# Patient Record
Sex: MALE | Race: WHITE | NOT HISPANIC OR LATINO | Employment: FULL TIME | URBAN - METROPOLITAN AREA
[De-identification: names, ages, dates, MRNs, and addresses within clinical notes are randomized per-mention and may not be internally consistent; named-entity substitution may affect disease eponyms.]

---

## 2018-02-07 ENCOUNTER — OFFICE VISIT (OUTPATIENT)
Dept: FAMILY MEDICINE CLINIC | Facility: CLINIC | Age: 33
End: 2018-02-07

## 2018-02-07 VITALS
HEIGHT: 72 IN | BODY MASS INDEX: 42.66 KG/M2 | DIASTOLIC BLOOD PRESSURE: 90 MMHG | WEIGHT: 315 LBS | SYSTOLIC BLOOD PRESSURE: 120 MMHG | OXYGEN SATURATION: 99 % | HEART RATE: 70 BPM | TEMPERATURE: 98.4 F

## 2018-02-07 DIAGNOSIS — Z00.00 VISIT FOR PREVENTIVE HEALTH EXAMINATION: Primary | ICD-10-CM

## 2018-02-07 PROCEDURE — 99395 PREV VISIT EST AGE 18-39: CPT | Performed by: INTERNAL MEDICINE

## 2018-02-07 NOTE — PROGRESS NOTES
Subjective chief complaint is a checkup and health screen  Taiwo Parisi is a 32 y.o. male.     History of Present Illness   Taiwo is here today for a checkup and health screening.  He basically has been feeling well.  His weight is fairly stable at 354 pounds.  His body mass index is 48.  He currently is on no prescription medicines other than occasionally Vimovo or knee pain.  This was prescribed by the orthopedist.  Have a prior history of some elevated blood pressure and hyperlipidemia.  He is a lifelong nonsmoker.  The following portions of the patient's history were reviewed and updated as appropriate: allergies, current medications, past medical history and problem list.    Review of Systems   Respiratory: Negative for chest tightness and shortness of breath.    Cardiovascular: Negative for chest pain.   Neurological: Negative for dizziness, light-headedness and headaches.       Objective   Physical Exam   Constitutional: He is oriented to person, place, and time. He appears well-developed and well-nourished.   Waist circumference is 48 inches   HENT:   Head: Normocephalic and atraumatic.   Eyes: No scleral icterus.   Cardiovascular: Normal rate, regular rhythm and normal heart sounds.    Pulmonary/Chest: Breath sounds normal. No respiratory distress. He has no wheezes. He has no rales.   Abdominal: Soft. Bowel sounds are normal. He exhibits no distension. There is no tenderness. There is no rebound and no guarding.   Musculoskeletal: He exhibits no edema.   Neurological: He is alert and oriented to person, place, and time.   Skin: Skin is warm and dry.   Psychiatric: He has a normal mood and affect.   Nursing note and vitals reviewed.      Assessment/Plan   Taiwo was seen today for follow-up.    Diagnoses and all orders for this visit:    Visit for preventive health examination  -     Lipid Panel  -     Basic Metabolic Panel  -     Nicotine Screen, Urine - Urine, Clean Catch     Taiwo is here today for  a preventive screening.  We are going to check the status of his cholesterol and blood sugar.  Further counseling will be done accordingly.  We did discuss possibility of some hepatitis B vaccinations we will discuss this once his lab results have returned.

## 2018-02-08 LAB
BUN SERPL-MCNC: 13 MG/DL (ref 6–20)
BUN/CREAT SERPL: 17 (ref 9–20)
CALCIUM SERPL-MCNC: 9.8 MG/DL (ref 8.7–10.2)
CHLORIDE SERPL-SCNC: 101 MMOL/L (ref 96–106)
CHOLEST SERPL-MCNC: 291 MG/DL (ref 100–199)
CO2 SERPL-SCNC: 25 MMOL/L (ref 18–29)
COTININE UR QL SCN: NEGATIVE NG/ML
CREAT SERPL-MCNC: 0.75 MG/DL (ref 0.76–1.27)
GFR SERPLBLD CREATININE-BSD FMLA CKD-EPI: 120 ML/MIN/1.73
GFR SERPLBLD CREATININE-BSD FMLA CKD-EPI: 138 ML/MIN/1.73
GLUCOSE SERPL-MCNC: 77 MG/DL (ref 65–99)
HDLC SERPL-MCNC: 37 MG/DL
INTERPRETATION: NORMAL
LDLC SERPL CALC-MCNC: 192 MG/DL (ref 0–99)
Lab: NORMAL
POTASSIUM SERPL-SCNC: 4.1 MMOL/L (ref 3.5–5.2)
SODIUM SERPL-SCNC: 145 MMOL/L (ref 134–144)
TRIGL SERPL-MCNC: 309 MG/DL (ref 0–149)
VLDLC SERPL CALC-MCNC: 62 MG/DL (ref 5–40)

## 2018-02-08 RX ORDER — ATORVASTATIN CALCIUM 10 MG/1
10 TABLET, FILM COATED ORAL DAILY
Qty: 30 TABLET | Refills: 5 | Status: SHIPPED | OUTPATIENT
Start: 2018-02-08 | End: 2018-06-20 | Stop reason: SDUPTHER

## 2018-06-18 ENCOUNTER — OFFICE VISIT (OUTPATIENT)
Dept: FAMILY MEDICINE CLINIC | Facility: CLINIC | Age: 33
End: 2018-06-18

## 2018-06-18 VITALS
SYSTOLIC BLOOD PRESSURE: 130 MMHG | HEIGHT: 72 IN | DIASTOLIC BLOOD PRESSURE: 90 MMHG | TEMPERATURE: 98.5 F | WEIGHT: 315 LBS | OXYGEN SATURATION: 99 % | HEART RATE: 70 BPM | BODY MASS INDEX: 42.66 KG/M2

## 2018-06-18 DIAGNOSIS — I10 ESSENTIAL HYPERTENSION: ICD-10-CM

## 2018-06-18 DIAGNOSIS — E78.2 MIXED HYPERLIPIDEMIA: Primary | ICD-10-CM

## 2018-06-18 PROCEDURE — 99214 OFFICE O/P EST MOD 30 MIN: CPT | Performed by: INTERNAL MEDICINE

## 2018-06-18 PROCEDURE — 90746 HEPB VACCINE 3 DOSE ADULT IM: CPT | Performed by: INTERNAL MEDICINE

## 2018-06-18 PROCEDURE — 90471 IMMUNIZATION ADMIN: CPT | Performed by: INTERNAL MEDICINE

## 2018-06-18 RX ORDER — LISINOPRIL 10 MG/1
10 TABLET ORAL DAILY
Qty: 30 TABLET | Refills: 5 | Status: SHIPPED | OUTPATIENT
Start: 2018-06-18 | End: 2018-07-19 | Stop reason: SDUPTHER

## 2018-06-18 NOTE — PROGRESS NOTES
Subjective if complaint is follow-up for cholesterol but also questions about vaccines  Taiwo Parisi is a 32 y.o. male.     History of Present Illness   Taiwo is here today for follow-up.  He does have hyperlipidemia.  We did start him on 10 mg of Lipitor in February.  He has tolerated this without muscle side effects.  He has had some mild elevations in his blood pressure.  We have not started him on any medication.  His blood pressure is elevated again today.  He is on vacation right now.  He does work in the [employer] field.  He has had the initial 2 hepatitis B vaccines but has not had the third.  He also would like a hepatitis A vaccine.  Does have a question about rabies.  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  He has not actually had any animal bites.  The following portions of the patient's history were reviewed and updated as appropriate: allergies, current medications, past medical history and problem list.    Review of Systems   Respiratory: Negative for chest tightness and shortness of breath.    Cardiovascular: Negative for chest pain and leg swelling.   Musculoskeletal: Negative for myalgias.   Neurological: Negative for dizziness, light-headedness and headache.       Objective   Physical Exam   Constitutional:   He is obese by body mass index.   Cardiovascular: Normal rate, regular rhythm, normal heart sounds and intact distal pulses.    [employer] pressure to my exam is 120/92   Pulmonary/Chest: Effort normal and breath sounds normal. No respiratory distress. He has no wheezes. He has no rales.   Abdominal: Soft. Bowel sounds are normal. He exhibits no distension. There is no tenderness. There is no guarding.   Musculoskeletal: He exhibits no edema.   Nursing note and vitals reviewed.        Assessment/Plan   Taiwo was seen today for follow-up.    Diagnoses and all orders for this visit:    Mixed hyperlipidemia  -     Comprehensive Metabolic Panel  -     Lipid Panel    Essential hypertension  -     CBC &  Differential    Other orders  -     lisinopril (PRINIVIL,ZESTRIL) 10 MG tablet; Take 1 tablet by mouth Daily.  -     Hepatitis B Vaccine Adult CHIKI Maravilla is here today for follow-up on his cholesterol.  We are going to check on this.  He may need a slightly higher dose.  His blood pressure is remaining elevated.  I'm going to start him on 10 mg of lisinopril daily.  We did discuss its side effects.  We are going to administer the third in the series of hepatitis B vaccine.  We will check antibodies down the road.  He is also going to get hepatitis A vaccine at an outside facility.

## 2018-06-19 LAB
ALBUMIN SERPL-MCNC: 4.5 G/DL (ref 3.5–5.2)
ALBUMIN/GLOB SERPL: 1.7 G/DL
ALP SERPL-CCNC: 64 U/L (ref 39–117)
ALT SERPL-CCNC: 32 U/L (ref 1–41)
AST SERPL-CCNC: 23 U/L (ref 1–40)
BASOPHILS # BLD AUTO: 0.01 10*3/MM3 (ref 0–0.2)
BASOPHILS NFR BLD AUTO: 0.2 % (ref 0–1.5)
BILIRUB SERPL-MCNC: 0.2 MG/DL (ref 0.1–1.2)
BUN SERPL-MCNC: 13 MG/DL (ref 6–20)
BUN/CREAT SERPL: 17.1 (ref 7–25)
CALCIUM SERPL-MCNC: 9.5 MG/DL (ref 8.6–10.5)
CHLORIDE SERPL-SCNC: 107 MMOL/L (ref 98–107)
CHOLEST SERPL-MCNC: 248 MG/DL (ref 0–200)
CO2 SERPL-SCNC: 23.5 MMOL/L (ref 22–29)
CREAT SERPL-MCNC: 0.76 MG/DL (ref 0.76–1.27)
EOSINOPHIL # BLD AUTO: 0.06 10*3/MM3 (ref 0–0.7)
EOSINOPHIL NFR BLD AUTO: 1.2 % (ref 0.3–6.2)
ERYTHROCYTE [DISTWIDTH] IN BLOOD BY AUTOMATED COUNT: 12.9 % (ref 11.5–14.5)
GFR SERPLBLD CREATININE-BSD FMLA CKD-EPI: 117 ML/MIN/1.73
GFR SERPLBLD CREATININE-BSD FMLA CKD-EPI: 142 ML/MIN/1.73
GLOBULIN SER CALC-MCNC: 2.7 GM/DL
GLUCOSE SERPL-MCNC: 94 MG/DL (ref 65–99)
HCT VFR BLD AUTO: 42.8 % (ref 40.4–52.2)
HDLC SERPL-MCNC: 41 MG/DL (ref 40–60)
HGB BLD-MCNC: 13.5 G/DL (ref 13.7–17.6)
IMM GRANULOCYTES # BLD: 0 10*3/MM3 (ref 0–0.03)
IMM GRANULOCYTES NFR BLD: 0 % (ref 0–0.5)
LDLC SERPL CALC-MCNC: 153 MG/DL (ref 0–100)
LYMPHOCYTES # BLD AUTO: 2.5 10*3/MM3 (ref 0.9–4.8)
LYMPHOCYTES NFR BLD AUTO: 50.7 % (ref 19.6–45.3)
MCH RBC QN AUTO: 28.5 PG (ref 27–32.7)
MCHC RBC AUTO-ENTMCNC: 31.5 G/DL (ref 32.6–36.4)
MCV RBC AUTO: 90.5 FL (ref 79.8–96.2)
MONOCYTES # BLD AUTO: 0.46 10*3/MM3 (ref 0.2–1.2)
MONOCYTES NFR BLD AUTO: 9.3 % (ref 5–12)
NEUTROPHILS # BLD AUTO: 1.9 10*3/MM3 (ref 1.9–8.1)
NEUTROPHILS NFR BLD AUTO: 38.6 % (ref 42.7–76)
PLATELET # BLD AUTO: 264 10*3/MM3 (ref 140–500)
POTASSIUM SERPL-SCNC: 4.8 MMOL/L (ref 3.5–5.2)
PROT SERPL-MCNC: 7.2 G/DL (ref 6–8.5)
RBC # BLD AUTO: 4.73 10*6/MM3 (ref 4.6–6)
SODIUM SERPL-SCNC: 146 MMOL/L (ref 136–145)
TRIGL SERPL-MCNC: 270 MG/DL (ref 0–150)
VLDLC SERPL CALC-MCNC: 54 MG/DL (ref 5–40)
WBC # BLD AUTO: 4.93 10*3/MM3 (ref 4.5–10.7)

## 2018-06-20 RX ORDER — ATORVASTATIN CALCIUM 20 MG/1
20 TABLET, FILM COATED ORAL DAILY
Qty: 90 TABLET | Refills: 1 | Status: SHIPPED | OUTPATIENT
Start: 2018-06-20 | End: 2018-10-26 | Stop reason: SDUPTHER

## 2018-07-19 ENCOUNTER — OFFICE VISIT (OUTPATIENT)
Dept: FAMILY MEDICINE CLINIC | Facility: CLINIC | Age: 33
End: 2018-07-19

## 2018-07-19 VITALS
HEIGHT: 72 IN | DIASTOLIC BLOOD PRESSURE: 75 MMHG | TEMPERATURE: 98 F | HEART RATE: 84 BPM | OXYGEN SATURATION: 97 % | WEIGHT: 315 LBS | BODY MASS INDEX: 42.66 KG/M2 | SYSTOLIC BLOOD PRESSURE: 125 MMHG

## 2018-07-19 DIAGNOSIS — R51.9 ACUTE NONINTRACTABLE HEADACHE, UNSPECIFIED HEADACHE TYPE: ICD-10-CM

## 2018-07-19 DIAGNOSIS — E78.2 MIXED HYPERLIPIDEMIA: ICD-10-CM

## 2018-07-19 DIAGNOSIS — I10 ESSENTIAL HYPERTENSION: Primary | ICD-10-CM

## 2018-07-19 DIAGNOSIS — Z01.84 IMMUNITY STATUS TESTING: ICD-10-CM

## 2018-07-19 PROCEDURE — 99214 OFFICE O/P EST MOD 30 MIN: CPT | Performed by: INTERNAL MEDICINE

## 2018-07-19 RX ORDER — LISINOPRIL 20 MG/1
20 TABLET ORAL DAILY
Qty: 30 TABLET | Refills: 5 | Status: SHIPPED | OUTPATIENT
Start: 2018-07-19 | End: 2019-03-18 | Stop reason: SDUPTHER

## 2018-07-20 LAB
ALBUMIN SERPL-MCNC: 4.9 G/DL (ref 3.5–5.2)
ALP SERPL-CCNC: 71 U/L (ref 39–117)
ALT SERPL-CCNC: 32 U/L (ref 1–41)
AST SERPL-CCNC: 18 U/L (ref 1–40)
BILIRUB DIRECT SERPL-MCNC: <0.2 MG/DL (ref 0–0.3)
BILIRUB SERPL-MCNC: 0.4 MG/DL (ref 0.1–1.2)
CHOLEST SERPL-MCNC: 205 MG/DL (ref 0–200)
CK SERPL-CCNC: 173 U/L (ref 20–200)
HBV SURFACE AB SER-ACNC: >1000 MIU/ML
HDLC SERPL-MCNC: 40 MG/DL (ref 40–60)
LDLC SERPL CALC-MCNC: 118 MG/DL (ref 0–100)
PROT SERPL-MCNC: 7.5 G/DL (ref 6–8.5)
TRIGL SERPL-MCNC: 237 MG/DL (ref 0–150)
VLDLC SERPL CALC-MCNC: 47.4 MG/DL (ref 5–40)

## 2018-10-25 ENCOUNTER — OFFICE VISIT (OUTPATIENT)
Dept: FAMILY MEDICINE CLINIC | Facility: CLINIC | Age: 33
End: 2018-10-25

## 2018-10-25 VITALS
WEIGHT: 315 LBS | DIASTOLIC BLOOD PRESSURE: 84 MMHG | BODY MASS INDEX: 42.66 KG/M2 | HEART RATE: 61 BPM | TEMPERATURE: 99 F | OXYGEN SATURATION: 98 % | SYSTOLIC BLOOD PRESSURE: 120 MMHG | HEIGHT: 72 IN

## 2018-10-25 DIAGNOSIS — I10 ESSENTIAL HYPERTENSION: Primary | ICD-10-CM

## 2018-10-25 DIAGNOSIS — G47.10 HYPERSOMNOLENCE: ICD-10-CM

## 2018-10-25 DIAGNOSIS — E78.2 MIXED HYPERLIPIDEMIA: ICD-10-CM

## 2018-10-25 DIAGNOSIS — G47.00 INSOMNIA, UNSPECIFIED TYPE: ICD-10-CM

## 2018-10-25 LAB
ALBUMIN SERPL-MCNC: 4.8 G/DL (ref 3.5–5.2)
ALBUMIN/GLOB SERPL: 1.7 G/DL
ALP SERPL-CCNC: 73 U/L (ref 39–117)
ALT SERPL-CCNC: 40 U/L (ref 1–41)
AST SERPL-CCNC: 24 U/L (ref 1–40)
BILIRUB SERPL-MCNC: 0.4 MG/DL (ref 0.1–1.2)
BUN SERPL-MCNC: 13 MG/DL (ref 6–20)
BUN/CREAT SERPL: 15.1 (ref 7–25)
CALCIUM SERPL-MCNC: 9.7 MG/DL (ref 8.6–10.5)
CHLORIDE SERPL-SCNC: 103 MMOL/L (ref 98–107)
CHOLEST SERPL-MCNC: 203 MG/DL (ref 0–200)
CO2 SERPL-SCNC: 26.6 MMOL/L (ref 22–29)
CREAT SERPL-MCNC: 0.86 MG/DL (ref 0.76–1.27)
GLOBULIN SER CALC-MCNC: 2.9 GM/DL
GLUCOSE SERPL-MCNC: 89 MG/DL (ref 65–99)
HDLC SERPL-MCNC: 39 MG/DL (ref 40–60)
LDLC SERPL CALC-MCNC: 116 MG/DL (ref 0–100)
POTASSIUM SERPL-SCNC: 4.6 MMOL/L (ref 3.5–5.2)
PROT SERPL-MCNC: 7.7 G/DL (ref 6–8.5)
SODIUM SERPL-SCNC: 143 MMOL/L (ref 136–145)
T4 FREE SERPL-MCNC: 1.1 NG/DL (ref 0.93–1.7)
TRIGL SERPL-MCNC: 238 MG/DL (ref 0–150)
TSH SERPL DL<=0.005 MIU/L-ACNC: 0.89 MIU/ML (ref 0.27–4.2)
VLDLC SERPL CALC-MCNC: 47.6 MG/DL (ref 5–40)

## 2018-10-25 PROCEDURE — 99214 OFFICE O/P EST MOD 30 MIN: CPT | Performed by: INTERNAL MEDICINE

## 2018-10-25 NOTE — PROGRESS NOTES
Subjective chief complaint is follow-up for blood pressure and cholesterol.  Taiwo Parisi is a 33 y.o. male.     History of Present Illness   Taiwo is here today for follow-up.  He does have hypertension.  He is on lisinopril 20 mg.  This seems to be controlling things fairly well.  His headaches seem to have resolved.  He also has some hyperlipidemia.  He is on 20 mg of atorvastatin.  His cholesterol improved with this we did advise him to continue to watch his diet.  He has lost 12 pounds since his last visit.  His biggest problem seems to be trouble sleeping. In the last 3 weeks he has had trouble falling asleep and staying asleep.  He therefore is experiencing some daytime fatigue.  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  He reports that he had a home test for sleep apnea years ago but it was somewhat inconclusive.  The following portions of the patient's history were reviewed and updated as appropriate: allergies, current medications, past medical history and problem list.    Review of Systems   Constitutional: Positive for fatigue.   Respiratory: Negative for chest tightness and shortness of breath.    Cardiovascular: Negative for chest pain.   Psychiatric/Behavioral: Positive for sleep disturbance. The patient is not nervous/anxious.        Objective   Physical Exam   Constitutional: He appears well-developed and well-nourished.   Cardiovascular: Normal rate, regular rhythm and normal heart sounds.  Exam reveals no gallop and no friction rub.    No murmur heard.  Pulmonary/Chest: Effort normal and breath sounds normal. No respiratory distress. He has no wheezes. He has no rales.   Musculoskeletal: He exhibits no edema.   Psychiatric: He has a normal mood and affect.   Nursing note and vitals reviewed.        Assessment/Plan   Taiwo was seen today for follow-up.    Diagnoses and all orders for this visit:    Essential hypertension  -     Comprehensive  Metabolic Panel    Mixed hyperlipidemia  -     Lipid Panel  -     TSH+Free T4  -     Comprehensive Metabolic Panel    Insomnia, unspecified type  -     TSH+Free T4    Hypersomnolence  -     Ambulatory Referral to Sleep Medicine      Taiwo is here today for follow-up.  His blood pressure seems to be doing well.  We are going to check his cholesterol.  Muscle going to check some thyroid labs because of his sleep disturbance.  We are going refer him for some sleep testing for possible obstructive sleep apnea.  In the interim he is going to try between 5 and 10 mg of melatonin nightly.

## 2018-10-26 RX ORDER — ATORVASTATIN CALCIUM 40 MG/1
40 TABLET, FILM COATED ORAL DAILY
Qty: 90 TABLET | Refills: 1 | Status: SHIPPED | OUTPATIENT
Start: 2018-10-26 | End: 2019-08-06 | Stop reason: SDUPTHER

## 2019-01-29 ENCOUNTER — OFFICE VISIT (OUTPATIENT)
Dept: SLEEP MEDICINE | Facility: HOSPITAL | Age: 34
End: 2019-01-29
Attending: INTERNAL MEDICINE

## 2019-01-29 VITALS
HEART RATE: 86 BPM | BODY MASS INDEX: 42.66 KG/M2 | WEIGHT: 315 LBS | HEIGHT: 72 IN | SYSTOLIC BLOOD PRESSURE: 145 MMHG | DIASTOLIC BLOOD PRESSURE: 80 MMHG

## 2019-01-29 DIAGNOSIS — E66.01 MORBID OBESITY (HCC): ICD-10-CM

## 2019-01-29 DIAGNOSIS — I10 ESSENTIAL HYPERTENSION: ICD-10-CM

## 2019-01-29 DIAGNOSIS — G47.33 OSA (OBSTRUCTIVE SLEEP APNEA): Primary | ICD-10-CM

## 2019-01-29 DIAGNOSIS — R06.83 SNORING: ICD-10-CM

## 2019-01-29 PROCEDURE — G0463 HOSPITAL OUTPT CLINIC VISIT: HCPCS

## 2019-01-29 NOTE — PROGRESS NOTES
[facility]      Patient Care Team:  Awais Felton MD as PCP - General    Referring Provider: Awais Felton MD    Chief complaint:   Waking up frequently at night    History of present illness:    Subjective   This is a 33-year-old male patient who was referred for sleep disorders.  He reported problems staying asleep.  He described waking up several times at night but usually does not have problems on back to sleep.    Upon further questioning, he reported loud snoring which awakens him at night.  He also said that he stops breathing.  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  He denies any issues falling asleep while driving but reported significant daytime fatigue and sleepiness.    Sleep schedule:  -Bedtime: 11 PM-midnight on the weekdays and midnight-1 AM on the weekends  -Sleep latency: 0-30 minutes  -Wake up time: 6-7 AM on the weekdays and 7-9 AM on the weekends , does not feel refreshed  -Nocturnal awakening: He reported frequent awakening every couple of hours to change position and 2 times because of nocturia.  No difficulties going back to sleep.  -Perceived sleep hours: 6      ESS: Total score: 6     Is not exercise regularly.  He does not follow any special diet.  He gained about 30 pounds over the last 5 years.    He has hypertension and takes lisinopril.  He stated that his blood pressure is usually regulated with that.    Review of Systems  Constitutional: No fever or chills. No changes in appetite.   ENMT: No sinus congestion, postnasal drip, hoarsness but reported nasal bleeding at night and he attributed that to dryness in his place of living.  Cardiovascular: No chest pain, palpitation or legs swelling.    Respiratory: Shortness of breath on activities.  Recent cough with contact to a coworker who was diagnosed with flu.  Gastrointestinal: No constipation, diarrhea, abdominal pain or acid  "reflux  Neurology: No headache, weakness, numbness or dizziness.   Musculoskeletal: No joints pain, stiffness or swelling.   Psychiatry: No depression, anxiety or irritability.   Hem/Lymphatic: No swollen glands or easy bruising.  Integumentary: No rash.  Endocrinology: No excessive thirst, cold or warm intolerance.   Urinary: No dysuria, bloody urine or frequent urination.     History  PMH:  Hypertension  Dyslipidemia.    PSH: None.    Social history: He works as an .  He does not smoke.  He drinks 2-3 caffeinated beverages a day usually soda and energy drink.  He does not drink alcohol.    Allergy: He reported allergy to cefaclor.    Family history:  Positive for thyroid cancer, heart disease and hypertension.  Negative for sleep apnea    Medications:    Current Outpatient Medications:   •  atorvastatin (LIPITOR) 40 MG tablet, Take 1 tablet by mouth Daily., Disp: 90 tablet, Rfl: 1  •  lisinopril (PRINIVIL,ZESTRIL) 20 MG tablet, Take 1 tablet by mouth Daily., Disp: 30 tablet, Rfl: 5  •  Naproxen-Esomeprazole 500-20 MG tablet delayed-release, Take  by mouth 2 (Two) Times a Day., Disp: , Rfl:       Objective   Vital Signs:  Vitals:    01/29/19 1100   BP: 145/80   Pulse: 86   Weight: (!) 172 kg (379 lb)   Height: 182.9 cm (72\")     Body mass index is 51.4 kg/m².  Neck Circumference: 21 inches     Physical Exam:  Neck Circumference: 21 inches     Constitutional: Not in acute distress.  Eyes: Injected conjunctiva, EOMI.  ENMT: Hidalgo score 3. Mallampati score 3. No oral thrush. Tonsils grade 1. Narrow distance in between the posterior pharyngeal pillars (<25 %). Large tongue.  Neck: Large. No thyromegaly.  Trachea midline.   Heart: Regular rhythm and rate, no murmur  Lungs: Good and equal air entry bilaterally. No crackles or wheezing.  Nonlabored breathing.       Abdomen: Obese.  Soft.  No HSM  Extremities: No cyanosis, clubbing or pitting edema. Moves all extremities.  Neuro: Conscious, alert, " oriented x3.   Psych: Appropriate mood and affect.    Integumentary: No rash  Lymphatic: No palpable cervical or supraclavicular lymph nodes.    Diagnostic data:  Labs reviewed: Serum bicarbonate on 10/25/18 was 26.6; hemoglobin on 6/8/18 was 13.5    Assessment   1. Snoring, highly suspected sleep apnea  2. Essential hypertension  3. Morbid obesity (BMI=51)      Plan:  Proceed with HST for sleep apnea evaluation.  I discussed the pathophysiology of sleep apnea with the patient, testing and therapy which mainly include CPAP and weight loss.  Patient is agreeable with CPAP therapy if needed.    We discussed estrogen between hypertension and sleep apnea and the beneficial effect of CPAP therapy.  Continue lisinopril the meanwhile.  I counseled the patient for low salt diet.      I counseled the patient for weight loss and exercise.  I informed him that losing weight may decrease the severity of sleep apnea and obviate the need of CPAP therapy.  We also discussed cutting down on carbohydrates and meals portion.        Rita Jalloh MD  01/29/19  11:58 AM    This note was dictated utilizing Dragon dictation

## 2019-01-31 ENCOUNTER — OFFICE VISIT (OUTPATIENT)
Dept: FAMILY MEDICINE CLINIC | Facility: CLINIC | Age: 34
End: 2019-01-31

## 2019-01-31 VITALS
BODY MASS INDEX: 42.66 KG/M2 | OXYGEN SATURATION: 99 % | WEIGHT: 315 LBS | TEMPERATURE: 99 F | SYSTOLIC BLOOD PRESSURE: 120 MMHG | DIASTOLIC BLOOD PRESSURE: 90 MMHG | HEART RATE: 95 BPM | HEIGHT: 72 IN

## 2019-01-31 DIAGNOSIS — J06.9 VIRAL URI WITH COUGH: Primary | ICD-10-CM

## 2019-01-31 PROCEDURE — 99214 OFFICE O/P EST MOD 30 MIN: CPT | Performed by: INTERNAL MEDICINE

## 2019-01-31 RX ORDER — AZITHROMYCIN 250 MG/1
TABLET, FILM COATED ORAL
Qty: 6 TABLET | Refills: 0 | Status: SHIPPED | OUTPATIENT
Start: 2019-01-31 | End: 2019-08-06

## 2019-01-31 RX ORDER — BROMPHENIRAMINE MALEATE, PSEUDOEPHEDRINE HYDROCHLORIDE, AND DEXTROMETHORPHAN HYDROBROMIDE 2; 30; 10 MG/5ML; MG/5ML; MG/5ML
5 SYRUP ORAL 4 TIMES DAILY PRN
Qty: 118 ML | Refills: 1 | Status: SHIPPED | OUTPATIENT
Start: 2019-01-31 | End: 2019-12-06

## 2019-01-31 NOTE — PROGRESS NOTES
Subjective chief complaint is congestion and cough  Taiwo Parisi is a 33 y.o. male.     History of Present Illness   Taiwo is here today for complaints of congestion and cough.  This began fairly suddenly approximately 4-5 days ago.  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  He now has an associated headache.  This is fairly severe.  He is not having fever or chills.  His morning he reports having a feeling of shortness of breath.  He did use a inhaler which seemed to help.  The following portions of the patient's history were reviewed and updated as appropriate: allergies, current medications, past medical history and problem list.    Review of Systems   HENT: Positive for congestion, rhinorrhea and sinus pressure.    Respiratory: Positive for cough and shortness of breath.    Neurological: Positive for headache.       Objective   Physical Exam   Constitutional: He appears well-developed and well-nourished.   HENT:   Tympanic membranes are normal.  There is bilateral nasal congestion but rhinorrhea is clear.  There is mild erythema.  Oral pharynx shows no exudates.   Cardiovascular: Normal rate, regular rhythm and normal heart sounds.   Pulmonary/Chest: Effort normal and breath sounds normal. No stridor. No respiratory distress. He has no wheezes. He has no rales.   Nursing note and vitals reviewed.        Assessment/Plan   Taiwo was seen today for nasal congestion, cough and headache.    Diagnoses and all orders for this visit:    Viral URI with cough    Taiwo is here today for evaluation of respiratory symptoms.  Even though his flu test is negative I suspect he does have influenza.  Suspect that his wife has the severe headache.  I am going to keep him off work through second of February.  I am going to treat him with some cough medicine and have given him a Z-Bogdan as a safety net.

## 2019-03-05 ENCOUNTER — HOSPITAL ENCOUNTER (OUTPATIENT)
Dept: SLEEP MEDICINE | Facility: HOSPITAL | Age: 34
Discharge: HOME | End: 2019-03-05
Attending: INTERNAL MEDICINE | Admitting: INTERNAL MEDICINE

## 2019-03-05 DIAGNOSIS — G47.33 OSA (OBSTRUCTIVE SLEEP APNEA): ICD-10-CM

## 2019-03-05 PROCEDURE — 95806 SLEEP STUDY UNATT&RESP EFFT: CPT

## 2019-03-13 ENCOUNTER — TELEPHONE (OUTPATIENT)
Dept: SLEEP MEDICINE | Facility: HOSPITAL | Age: 34
End: 2019-03-13

## 2019-03-13 NOTE — TELEPHONE ENCOUNTER
Tech called pts home/mobile #, pt answered and tech went over HST study results and Dr's impression. Patient verbalized his understanding. Patient wanted to do some research into a DME company and stated would call back on Friday with his [employer]ME [employer]. Also mailed out patients study results and study to have on hand. TODD

## 2019-03-15 ENCOUNTER — TELEPHONE (OUTPATIENT)
Dept: SLEEP MEDICINE | Facility: HOSPITAL | Age: 34
End: 2019-03-15

## 2019-03-15 NOTE — TELEPHONE ENCOUNTER
Called pt on home #, no answer. Left vm informing pt was reaching out to see if had found a DME provider would like to use. Patient to return call. MAB

## 2019-03-18 ENCOUNTER — TELEPHONE (OUTPATIENT)
Dept: SLEEP MEDICINE | Facility: HOSPITAL | Age: 34
End: 2019-03-18

## 2019-03-18 NOTE — TELEPHONE ENCOUNTER
Patient returned call , sending orders to [facility] sleep, follow up scheduled 5/2/19

## 2019-03-19 RX ORDER — LISINOPRIL 20 MG/1
TABLET ORAL
Qty: 30 TABLET | Refills: 4 | Status: SHIPPED | OUTPATIENT
Start: 2019-03-19 | End: 2019-11-08 | Stop reason: SDUPTHER

## 2019-04-01 ENCOUNTER — TELEPHONE (OUTPATIENT)
Dept: FAMILY MEDICINE CLINIC | Facility: CLINIC | Age: 34
End: 2019-04-01
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
## 2019-04-09 ENCOUNTER — TELEPHONE (OUTPATIENT)
Dept: FAMILY MEDICINE CLINIC | Facility: CLINIC | Age: 34
End: 2019-04-09
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
## 2019-05-02 ENCOUNTER — APPOINTMENT (OUTPATIENT)
Dept: SLEEP MEDICINE | Facility: HOSPITAL | Age: 34
End: 2019-05-02

## 2019-08-06 RX ORDER — ATORVASTATIN CALCIUM 40 MG/1
TABLET, FILM COATED ORAL
Qty: 90 TABLET | Refills: 0 | Status: SHIPPED | OUTPATIENT
Start: 2019-08-06 | End: 2019-12-06 | Stop reason: SDUPTHER

## 2019-11-05 RX ORDER — LISINOPRIL 20 MG/1
TABLET ORAL
Qty: 30 TABLET | Refills: 3 | OUTPATIENT
Start: 2019-11-05

## 2019-11-08 RX ORDER — LISINOPRIL 20 MG/1
20 TABLET ORAL DAILY
Qty: 30 TABLET | Refills: 0 | Status: SHIPPED | OUTPATIENT
Start: 2019-11-08 | End: 2019-12-06 | Stop reason: SDUPTHER

## 2019-12-06 ENCOUNTER — OFFICE VISIT (OUTPATIENT)
Dept: FAMILY MEDICINE CLINIC | Facility: CLINIC | Age: 34
End: 2019-12-06

## 2019-12-06 VITALS
HEIGHT: 72 IN | TEMPERATURE: 98.2 F | SYSTOLIC BLOOD PRESSURE: 116 MMHG | OXYGEN SATURATION: 96 % | WEIGHT: 315 LBS | DIASTOLIC BLOOD PRESSURE: 70 MMHG | HEART RATE: 88 BPM | BODY MASS INDEX: 42.66 KG/M2

## 2019-12-06 DIAGNOSIS — I10 ESSENTIAL HYPERTENSION: Primary | ICD-10-CM

## 2019-12-06 DIAGNOSIS — E78.2 MIXED HYPERLIPIDEMIA: ICD-10-CM

## 2019-12-06 DIAGNOSIS — J40 BRONCHITIS: ICD-10-CM

## 2019-12-06 PROCEDURE — 99214 OFFICE O/P EST MOD 30 MIN: CPT | Performed by: INTERNAL MEDICINE

## 2019-12-06 RX ORDER — FLUTICASONE PROPIONATE 50 MCG
1 SPRAY, SUSPENSION (ML) NASAL DAILY
COMMUNITY
Start: 2019-12-03 | End: 2020-01-02

## 2019-12-06 RX ORDER — BENZONATATE 200 MG/1
200 CAPSULE ORAL
COMMUNITY
Start: 2019-12-03 | End: 2020-07-13 | Stop reason: ALTCHOICE

## 2019-12-06 RX ORDER — CETIRIZINE HYDROCHLORIDE 10 MG/1
10 TABLET ORAL DAILY
COMMUNITY
Start: 2019-12-03 | End: 2020-01-02

## 2019-12-06 RX ORDER — LISINOPRIL 20 MG/1
20 TABLET ORAL DAILY
Qty: 90 TABLET | Refills: 1 | Status: SHIPPED | OUTPATIENT
Start: 2019-12-06 | End: 2020-09-23 | Stop reason: SDUPTHER

## 2019-12-06 RX ORDER — ATORVASTATIN CALCIUM 40 MG/1
40 TABLET, FILM COATED ORAL DAILY
Qty: 90 TABLET | Refills: 1 | Status: SHIPPED | OUTPATIENT
Start: 2019-12-06 | End: 2020-09-30 | Stop reason: SDUPTHER

## 2019-12-06 RX ORDER — METHYLPREDNISOLONE 4 MG/1
TABLET ORAL
COMMUNITY
Start: 2019-12-03 | End: 2020-07-13 | Stop reason: ALTCHOICE

## 2019-12-06 NOTE — PROGRESS NOTES
Subjective Taiwo is here today for follow-up on his hypertension  Taiwo Parisi is a 34 y.o. male.     History of Present Illness   His blood pressure typically is been well controlled with lisinopril 20 mg daily.  He also takes some atorvastatin for his cholesterol.  Recently he did have a respiratory illness.  He was seen at an [facility] center.  He was prescribed Tessalon Perles as well as a Medrol Dosepak and a nasal steroid.  His symptoms have improved some but he still has a cough.  The cough is keeping him awake at night.  The following portions of the patient's history were reviewed and updated as appropriate: allergies, current medications, past family history, past medical history, past social history, past surgical history and problem list.    Review of Systems   Constitutional: Negative for chills and fever.   HENT: Positive for congestion.    Respiratory: Positive for cough. Negative for chest tightness and shortness of breath.        Objective   Physical Exam   Constitutional: He appears well-developed and well-nourished.   HENT:   Tympanic membranes are normal.  There is bilateral nasal congestion and erythema.  Posterior pharynx shows some drainage and mild erythema.   Cardiovascular: Normal rate and regular rhythm.   Pulmonary/Chest: Effort normal and breath sounds normal. He has no wheezes. He has no rales.   Musculoskeletal: He exhibits no edema.   Nursing note and vitals reviewed.        Assessment/Plan   Taiwo was seen today for hypertension, med refill and cough.    Diagnoses and all orders for this visit:    Essential hypertension  -     Comprehensive Metabolic Panel; Future    Mixed hyperlipidemia  -     Comprehensive Metabolic Panel; Future  -     Lipid Panel; Future    Bronchitis    Other orders  -     lisinopril (PRINIVIL,ZESTRIL) 20 MG tablet; Take 1 tablet by mouth Daily.  -     atorvastatin (LIPITOR) 40 MG tablet; Take 1 tablet by mouth Daily.  -     HYDROcod Polst-CPM Polst  ER (TUSSIONEX PENNKINETIC) 10-8 MG/5ML ER suspension; Take 5 mL by mouth Every 12 (Twelve) Hours As Needed for Cough.      Taiwo is here today for follow-up on his blood pressure but is also having some respiratory symptoms.  His lungs are clear and his oxygen saturation looks okay.  I am going to prescribe some Tussionex to help with his nocturnal cough.  I did advise him not to use the Zyrtec while taking this.  I will call him with his lab work on Monday.  If he is still having problems we may add an antibiotic at that time.

## 2019-12-07 ENCOUNTER — LAB (OUTPATIENT)
Dept: LAB | Facility: HOSPITAL | Age: 34
End: 2019-12-07

## 2019-12-07 DIAGNOSIS — E78.2 MIXED HYPERLIPIDEMIA: ICD-10-CM

## 2019-12-07 DIAGNOSIS — I10 ESSENTIAL HYPERTENSION: ICD-10-CM

## 2019-12-07 LAB
ALBUMIN SERPL-MCNC: 4.6 G/DL (ref 3.5–5.2)
ALBUMIN/GLOB SERPL: 1.3 G/DL
ALP SERPL-CCNC: 69 U/L (ref 39–117)
ALT SERPL W P-5'-P-CCNC: 37 U/L (ref 1–41)
ANION GAP SERPL CALCULATED.3IONS-SCNC: 13 MMOL/L (ref 5–15)
AST SERPL-CCNC: 16 U/L (ref 1–40)
BILIRUB SERPL-MCNC: 0.3 MG/DL (ref 0.2–1.2)
BUN BLD-MCNC: 14 MG/DL (ref 6–20)
BUN/CREAT SERPL: 17.7 (ref 7–25)
CALCIUM SPEC-SCNC: 9 MG/DL (ref 8.6–10.5)
CHLORIDE SERPL-SCNC: 103 MMOL/L (ref 98–107)
CHOLEST SERPL-MCNC: 148 MG/DL (ref 0–200)
CO2 SERPL-SCNC: 26 MMOL/L (ref 22–29)
CREAT BLD-MCNC: 0.79 MG/DL (ref 0.76–1.27)
GFR SERPL CREATININE-BSD FRML MDRD: 111 ML/MIN/1.73
GLOBULIN UR ELPH-MCNC: 3.6 GM/DL
GLUCOSE BLD-MCNC: 89 MG/DL (ref 65–99)
HDLC SERPL-MCNC: 43 MG/DL (ref 40–60)
LDLC SERPL CALC-MCNC: 88 MG/DL (ref 0–100)
LDLC/HDLC SERPL: 2.04 {RATIO}
POTASSIUM BLD-SCNC: 3.9 MMOL/L (ref 3.5–5.2)
PROT SERPL-MCNC: 8.2 G/DL (ref 6–8.5)
SODIUM BLD-SCNC: 142 MMOL/L (ref 136–145)
TRIGL SERPL-MCNC: 87 MG/DL (ref 0–150)
VLDLC SERPL-MCNC: 17.4 MG/DL (ref 5–40)

## 2019-12-07 PROCEDURE — 80053 COMPREHEN METABOLIC PANEL: CPT

## 2019-12-07 PROCEDURE — 36415 COLL VENOUS BLD VENIPUNCTURE: CPT

## 2019-12-07 PROCEDURE — 80061 LIPID PANEL: CPT

## 2020-07-01 DIAGNOSIS — R94.31 ABNORMAL EKG: Primary | ICD-10-CM

## 2020-07-13 ENCOUNTER — OFFICE VISIT (OUTPATIENT)
Dept: CARDIOLOGY | Facility: CLINIC | Age: 35
End: 2020-07-13

## 2020-07-13 VITALS
HEART RATE: 72 BPM | BODY MASS INDEX: 42.66 KG/M2 | HEIGHT: 72 IN | WEIGHT: 315 LBS | SYSTOLIC BLOOD PRESSURE: 138 MMHG | DIASTOLIC BLOOD PRESSURE: 78 MMHG

## 2020-07-13 DIAGNOSIS — I10 ESSENTIAL HYPERTENSION: Primary | ICD-10-CM

## 2020-07-13 DIAGNOSIS — I45.10 INCOMPLETE RBBB: ICD-10-CM

## 2020-07-13 DIAGNOSIS — E78.2 MIXED HYPERLIPIDEMIA: ICD-10-CM

## 2020-07-13 PROCEDURE — 93000 ELECTROCARDIOGRAM COMPLETE: CPT | Performed by: INTERNAL MEDICINE

## 2020-07-13 PROCEDURE — 99204 OFFICE O/P NEW MOD 45 MIN: CPT | Performed by: INTERNAL MEDICINE

## 2020-07-13 NOTE — PROGRESS NOTES
Taiwo LAU Ezradhajanie  1985  Date of Office Visit: 07/13/20  Encounter Provider: Titi Boyer MD  Place of Service: [facility] CARDIOLOGY    CHIEF COMPLAINT:  Hyperlipidemia  Essential hypertension  Morbid obesity  Abnormal EKG    HISTORY OF PRESENT ILLNESS: A 34-year-old male with a medical history of obesity, hypertension, and hyperlipidemia, who works as an HVAC contractor.  He was working in extreme heat and came lightheaded, quit sweating, and had severe headaches.  He felt that he was significantly dehydrated.  He completed his job and went to urgent care and was encouraged to go to the ER.  He had an EKG in the ER showing sinus tachycardia and an IVCD consistent with RSR prime pattern versus incomplete right bundle branch block.  He responded well to IV fluids and subsequently his symptoms resolved.  He has had no recurrence of those symptoms.  His EKG here today shows a sinus rhythm with an incomplete right bundle branch block.  He has no other significant abnormalities.        Review of Systems   Constitution: Negative for fever and malaise/fatigue.   HENT: Negative for nosebleeds and sore throat.    Eyes: Negative for blurred vision and double vision.   Cardiovascular: Negative for chest pain, claudication, palpitations and syncope.   Respiratory: Negative for cough, shortness of breath and snoring.    Endocrine: Negative for cold intolerance, heat intolerance and polydipsia.   Skin: Negative for itching, poor wound healing and rash.   Musculoskeletal: Negative for joint pain, joint swelling, muscle weakness and myalgias.   Gastrointestinal: Negative for abdominal pain, melena, nausea and vomiting.   Neurological: Negative for light-headedness, loss of balance, seizures, vertigo and weakness.   Psychiatric/Behavioral: Negative for altered mental status and depression.          Past Medical History:   Diagnosis Date   • Hyperlipidemia    • Hypertension    • Obesity  "       The following portions of the patient's history were reviewed and updated as appropriate: Social history , Family history and Surgical history     Current Outpatient Medications on File Prior to Visit   Medication Sig Dispense Refill   • atorvastatin (LIPITOR) 40 MG tablet Take 1 tablet by mouth Daily. 90 tablet 1   • Homeopathic Products (OSCILLOCOCCINUM PO) Take 1 tablet by mouth Daily.     • lisinopril (PRINIVIL,ZESTRIL) 20 MG tablet Take 1 tablet by mouth Daily. 90 tablet 1   • [DISCONTINUED] benzonatate (TESSALON) 200 MG capsule Take 200 mg by mouth.     • [DISCONTINUED] HYDROcod Polst-CPM Polst ER (TUSSIONEX PENNKINETIC) 10-8 MG/5ML ER suspension Take 5 mL by mouth Every 12 (Twelve) Hours As Needed for Cough. 115 mL 0   • [DISCONTINUED] methylPREDNISolone (MEDROL, ASHLEY,) 4 MG tablet      • [DISCONTINUED] Naproxen-Esomeprazole 500-20 MG tablet delayed-release Take  by mouth 2 (Two) Times a Day.       No current facility-administered medications on file prior to visit.        Allergies   Allergen Reactions   • Cefaclor        Vitals:    07/13/20 1204   BP: 138/78   Pulse: 72   Weight: (!) 169 kg (372 lb 12.8 oz)   Height: 182.9 cm (72\")     Physical Exam   Constitutional: He is oriented to person, place, and time. He appears well-developed and well-nourished.   Morbidly obese     HENT:   Head: Normocephalic and atraumatic.   Eyes: Conjunctivae and EOM are normal. No scleral icterus.   Neck: Normal range of motion. Neck supple. Normal carotid pulses, no hepatojugular reflux and no JVD present. Carotid bruit is not present. No tracheal deviation present. No thyromegaly present.   Cardiovascular: Normal rate and regular rhythm. Exam reveals no gallop and no friction rub.   No murmur heard.  Pulses:       Carotid pulses are 2+ on the right side, and 2+ on the left side.       Radial pulses are 2+ on the right side, and 2+ on the left side.        Femoral pulses are 2+ on the right side, and 2+ on the left " side.       Dorsalis pedis pulses are 2+ on the right side, and 2+ on the left side.        Posterior tibial pulses are 2+ on the right side, and 2+ on the left side.   Pulmonary/Chest: Breath sounds normal. No respiratory distress. He has no decreased breath sounds. He has no wheezes. He has no rhonchi. He has no rales. He exhibits no tenderness.   Abdominal: Soft. Bowel sounds are normal. He exhibits no distension. There is no tenderness. There is no rebound.   Musculoskeletal: He exhibits no edema or deformity.   Neurological: He is alert and oriented to person, place, and time. He has normal strength. No sensory deficit.   Skin: No rash noted. No erythema.   Psychiatric: He has a normal mood and affect. His behavior is normal.          Lab Results   Component Value Date    WBC 10.54 06/29/2020    HGB 13.4 (L) 06/29/2020    HCT 40.5 (L) 06/29/2020    MCV 86.9 06/29/2020     06/29/2020       Lab Results   Component Value Date    GLUCOSE 89 12/07/2019    BUN 17 06/29/2020    CREATININE 1.1 06/29/2020    EGFRIFNONA 111 12/07/2019    EGFRIFAFRI 123 10/25/2018    BCR 15.5 06/29/2020    K 4.3 06/29/2020    CO2 21 (L) 06/29/2020    CALCIUM 9.5 06/29/2020    PROTENTOTREF 7.7 10/25/2018    ALBUMIN 5.0 06/29/2020    LABIL2 1.5 06/29/2020    AST 36 06/29/2020    ALT 42 06/29/2020       Lab Results   Component Value Date    GLUCOSE 89 12/07/2019    CALCIUM 9.5 06/29/2020     06/29/2020    K 4.3 06/29/2020    CO2 21 (L) 06/29/2020     06/29/2020    BUN 17 06/29/2020    CREATININE 1.1 06/29/2020    EGFRIFAFRI 123 10/25/2018    EGFRIFNONA 111 12/07/2019    BCR 15.5 06/29/2020    ANIONGAP 13.0 12/07/2019       Lab Results   Component Value Date    CHOL 148 12/07/2019    CHLPL 203 (H) 10/25/2018    TRIG 87 12/07/2019    HDL 43 12/07/2019    LDL 88 12/07/2019         ECG 12 Lead  Date/Time: 7/13/2020 12:38 PM  Performed by: Titi Boyer MD  Authorized by: Titi Boyer MD   Comparison:  compared with previous ECG from 6/29/2020  Similar to previous ECG  Rhythm: sinus rhythm  Rate: normal  Conduction: incomplete right bundle branch block                  DISCUSSION/SUMMARYA very pleasant 34-year-old male with a medical history of morbid obesity, hypertension, and hyperlipidemia, who presented after becoming dehydrated and was noted to have sinus tachycardia with an incomplete right bundle branch block.  His heart rate has improved with hydration and he is asymptomatic from a cardiac standpoint.  He does not have any chest pain or dyspnea on exertion out of the rim of what would be expected for somebody who is overweight such as himself.    1.  Abnormal electrocardiogram: Only evidence of an incomplete right bundle branch block.  No work-up indicated.  2.  Essential hypertension: Reasonably controlled.  Continue current therapy.  3.  Morbid obesity: Weight loss and dietary modification have been recommended.

## 2020-09-21 RX ORDER — LISINOPRIL 20 MG/1
TABLET ORAL
Qty: 90 TABLET | Refills: 0 | OUTPATIENT
Start: 2020-09-21

## 2020-09-21 RX ORDER — ATORVASTATIN CALCIUM 40 MG/1
TABLET, FILM COATED ORAL
Qty: 90 TABLET | Refills: 0 | OUTPATIENT
Start: 2020-09-21

## 2020-09-23 RX ORDER — LISINOPRIL 20 MG/1
20 TABLET ORAL DAILY
Qty: 90 TABLET | Refills: 1 | Status: SHIPPED | OUTPATIENT
Start: 2020-09-23 | End: 2021-04-19 | Stop reason: SDUPTHER

## 2020-09-30 ENCOUNTER — OFFICE VISIT (OUTPATIENT)
Dept: FAMILY MEDICINE CLINIC | Facility: CLINIC | Age: 35
End: 2020-09-30

## 2020-09-30 VITALS
SYSTOLIC BLOOD PRESSURE: 126 MMHG | BODY MASS INDEX: 42.66 KG/M2 | WEIGHT: 315 LBS | OXYGEN SATURATION: 98 % | TEMPERATURE: 97.4 F | RESPIRATION RATE: 16 BRPM | HEIGHT: 72 IN | DIASTOLIC BLOOD PRESSURE: 70 MMHG | HEART RATE: 90 BPM

## 2020-09-30 DIAGNOSIS — E78.2 MIXED HYPERLIPIDEMIA: ICD-10-CM

## 2020-09-30 DIAGNOSIS — Z23 NEED FOR VACCINATION: Primary | ICD-10-CM

## 2020-09-30 DIAGNOSIS — I10 ESSENTIAL HYPERTENSION: ICD-10-CM

## 2020-09-30 PROCEDURE — 99213 OFFICE O/P EST LOW 20 MIN: CPT | Performed by: INTERNAL MEDICINE

## 2020-09-30 PROCEDURE — 90471 IMMUNIZATION ADMIN: CPT | Performed by: INTERNAL MEDICINE

## 2020-09-30 PROCEDURE — 90686 IIV4 VACC NO PRSV 0.5 ML IM: CPT | Performed by: INTERNAL MEDICINE

## 2020-09-30 RX ORDER — ATORVASTATIN CALCIUM 40 MG/1
40 TABLET, FILM COATED ORAL DAILY
Qty: 90 TABLET | Refills: 1 | Status: SHIPPED | OUTPATIENT
Start: 2020-09-30 | End: 2021-04-19 | Stop reason: SDUPTHER

## 2020-10-05 ENCOUNTER — LAB (OUTPATIENT)
Dept: LAB | Facility: HOSPITAL | Age: 35
End: 2020-10-05

## 2020-10-05 DIAGNOSIS — E78.2 MIXED HYPERLIPIDEMIA: ICD-10-CM

## 2020-10-05 DIAGNOSIS — E78.2 MIXED HYPERLIPIDEMIA: Primary | ICD-10-CM

## 2020-10-05 DIAGNOSIS — I10 ESSENTIAL HYPERTENSION: ICD-10-CM

## 2020-10-05 LAB
ALBUMIN SERPL-MCNC: 4.5 G/DL (ref 3.5–5.2)
ALBUMIN/GLOB SERPL: 1.5 G/DL
ALP SERPL-CCNC: 68 U/L (ref 39–117)
ALT SERPL W P-5'-P-CCNC: 33 U/L (ref 1–41)
ANION GAP SERPL CALCULATED.3IONS-SCNC: 10.3 MMOL/L (ref 5–15)
AST SERPL-CCNC: 22 U/L (ref 1–40)
BILIRUB SERPL-MCNC: 0.2 MG/DL (ref 0–1.2)
BUN SERPL-MCNC: 10 MG/DL (ref 6–20)
BUN/CREAT SERPL: 11.8 (ref 7–25)
CALCIUM SPEC-SCNC: 9.2 MG/DL (ref 8.6–10.5)
CHLORIDE SERPL-SCNC: 105 MMOL/L (ref 98–107)
CHOLEST SERPL-MCNC: 264 MG/DL (ref 0–200)
CO2 SERPL-SCNC: 25.7 MMOL/L (ref 22–29)
CREAT SERPL-MCNC: 0.85 MG/DL (ref 0.76–1.27)
GFR SERPL CREATININE-BSD FRML MDRD: 101 ML/MIN/1.73
GLOBULIN UR ELPH-MCNC: 3 GM/DL
GLUCOSE SERPL-MCNC: 103 MG/DL (ref 65–99)
HDLC SERPL-MCNC: 36 MG/DL (ref 40–60)
LDLC SERPL CALC-MCNC: 178 MG/DL (ref 0–100)
LDLC/HDLC SERPL: 4.93 {RATIO}
POTASSIUM SERPL-SCNC: 4.6 MMOL/L (ref 3.5–5.2)
PROT SERPL-MCNC: 7.5 G/DL (ref 6–8.5)
SODIUM SERPL-SCNC: 141 MMOL/L (ref 136–145)
TRIGL SERPL-MCNC: 252 MG/DL (ref 0–150)
VLDLC SERPL-MCNC: 50.4 MG/DL (ref 5–40)

## 2020-10-05 PROCEDURE — 80053 COMPREHEN METABOLIC PANEL: CPT

## 2020-10-05 PROCEDURE — 80061 LIPID PANEL: CPT

## 2020-10-05 PROCEDURE — 36415 COLL VENOUS BLD VENIPUNCTURE: CPT

## 2021-01-25 ENCOUNTER — OFFICE VISIT (OUTPATIENT)
Dept: FAMILY MEDICINE CLINIC | Facility: CLINIC | Age: 36
End: 2021-01-25

## 2021-01-25 DIAGNOSIS — B97.89 VIRAL RESPIRATORY ILLNESS: Primary | ICD-10-CM

## 2021-01-25 DIAGNOSIS — J98.8 VIRAL RESPIRATORY ILLNESS: Primary | ICD-10-CM

## 2021-01-25 LAB
EXPIRATION DATE: NORMAL
FLUAV AG NPH QL: NEGATIVE
FLUBV AG NPH QL: NEGATIVE
INTERNAL CONTROL: NORMAL
Lab: NORMAL

## 2021-01-25 PROCEDURE — 99441 PR PHYS/QHP TELEPHONE EVALUATION 5-10 MIN: CPT | Performed by: INTERNAL MEDICINE

## 2021-01-25 NOTE — PROGRESS NOTES
Subjective  Answers for HPI/ROS submitted by the patient on 1/25/2021   What is the primary reason for your visit?: Other  Please describe your symptoms.: Nausea, shortness of breathe, fever, diarrhea, chills, persistent headache, itchy throat, couch  Have you had these symptoms before?: No  How long have you been having these symptoms?: 5-7 days  Please list any medications you are currently taking for this condition.: Tylenol    Taiwo Parisi is a 35 y.o. male.This visit has been rescheduled as a phone visit to comply with patient safety concerns in accordance with CDC recommendations. Total time of discussion was 10 minutes.        History of Present Illness Taiwo is here today for possible work clearance.  He began having some gastrointestinal symptoms on the 19th of this month.  He thought it was just because of nerves about his first day at work.  However upon leaving the building that evening he got short of breath walking to his car.  He was tested on the 20th and tested negative for Covid.  However since that time he has developed headache, fever, body aches, and cough.  He is not short of breath at rest but gets short of breath walking across the floor.  His gastrointestinal symptoms seem to have resolved.  He was not tested for influenza.  He did receive a influenza shot.  His wife is an oncology nurse.  She had a little bit of fever after getting the second Covid vaccine.  I did advise them to wear masks and isolate themselves within the home environment.    The following portions of the patient's history were reviewed and updated as appropriate: allergies, current medications, past family history, past medical history, past social history, past surgical history and problem list.    Review of Systems   Constitutional: Positive for chills, fatigue and fever.   Respiratory: Positive for shortness of breath.    Musculoskeletal: Positive for arthralgias and myalgias.   Neurological: Positive for  headache.       Objective   Physical Exam  Pulmonary:      Comments: Respirations do not sound labored on the phone  Neurological:      Mental Status: He is alert.      Comments: Speech sounds normal           Assessment/Plan   Diagnoses and all orders for this visit:    1. Viral respiratory illness (Primary)  -     COVID-19,[facility] ROUTINE, NP/OP SWAB IN TRANSPORT MEDIA OR ESWAB 72 HR TAT - Swab, Nasopharynx  -     POC Influenza A / B      Taiwo is experiencing viral respiratory symptoms.  We are going to test for both flu and Covid.  In the meantime I have advised he and his wife to isolate from each other within the home and wear masks indoors.  We will keep him off work at least until we see the results of these tests.  Currently he is not experiencing symptoms significant enough that he would want to take any type of cough medicine or steroids.

## 2021-01-26 LAB — SARS-COV-2 RNA RESP QL NAA+PROBE: NOT DETECTED

## 2021-01-27 ENCOUNTER — HOSPITAL ENCOUNTER (OUTPATIENT)
Dept: GENERAL RADIOLOGY | Facility: HOSPITAL | Age: 36
Discharge: HOME | End: 2021-01-27
Admitting: INTERNAL MEDICINE

## 2021-01-27 DIAGNOSIS — R06.02 SHORTNESS OF BREATH: ICD-10-CM

## 2021-01-27 DIAGNOSIS — R05.9 COUGH: ICD-10-CM

## 2021-01-27 DIAGNOSIS — R05.9 COUGH: Primary | ICD-10-CM

## 2021-01-27 PROCEDURE — 71046 X-RAY EXAM CHEST 2 VIEWS: CPT

## 2021-04-16 ENCOUNTER — BULK ORDERING (OUTPATIENT)
Dept: CASE MANAGEMENT | Facility: OTHER | Age: 36
End: 2021-04-16

## 2021-04-16 DIAGNOSIS — Z23 IMMUNIZATION DUE: ICD-10-CM

## 2021-04-19 ENCOUNTER — OFFICE VISIT (OUTPATIENT)
Dept: FAMILY MEDICINE CLINIC | Facility: CLINIC | Age: 36
End: 2021-04-19

## 2021-04-19 VITALS
HEIGHT: 72 IN | WEIGHT: 315 LBS | DIASTOLIC BLOOD PRESSURE: 70 MMHG | SYSTOLIC BLOOD PRESSURE: 128 MMHG | HEART RATE: 80 BPM | TEMPERATURE: 98.2 F | OXYGEN SATURATION: 100 % | BODY MASS INDEX: 42.66 KG/M2

## 2021-04-19 DIAGNOSIS — E78.2 MIXED HYPERLIPIDEMIA: ICD-10-CM

## 2021-04-19 DIAGNOSIS — I10 ESSENTIAL HYPERTENSION: Primary | ICD-10-CM

## 2021-04-19 DIAGNOSIS — R73.9 ELEVATED BLOOD SUGAR: ICD-10-CM

## 2021-04-19 PROCEDURE — 99213 OFFICE O/P EST LOW 20 MIN: CPT | Performed by: INTERNAL MEDICINE

## 2021-04-19 RX ORDER — LISINOPRIL 20 MG/1
20 TABLET ORAL DAILY
Qty: 90 TABLET | Refills: 1 | Status: SHIPPED | OUTPATIENT
Start: 2021-04-19 | End: 2021-08-16 | Stop reason: SDUPTHER

## 2021-04-19 RX ORDER — ATORVASTATIN CALCIUM 40 MG/1
40 TABLET, FILM COATED ORAL DAILY
Qty: 90 TABLET | Refills: 1 | Status: SHIPPED | OUTPATIENT
Start: 2021-04-19 | End: 2021-08-16 | Stop reason: SDUPTHER

## 2021-04-19 NOTE — PROGRESS NOTES
Subjective  Answers for HPI/ROS submitted by the patient on 4/19/2021  What is the primary reason for your visit?: Other  Please describe your symptoms.: Follow up visit to check cholesterol  Have you had these symptoms before?: Yes  How long have you been having these symptoms?: Greater than 2 weeks  Please list any medications you are currently taking for this condition.: Lisinopril  Please describe any probable cause for these symptoms. : Age and weight      Taiwo Parisi is a 35 y.o. male.     History of Present Illness chief complaint is to checkup on cholesterol and blood pressure.  Taiwo is here today for follow-up.  At his last visit his cholesterol seem to be out of control.  We did start some atorvastatin.  He has not had any problems with his muscles being sore or achy.  He has not been feeling weak.  He has been vaccinated against Covid.  His blood pressure currently is well controlled with lisinopril.  The following portions of the patient's history were reviewed and updated as appropriate: allergies, current medications, past family history, past medical history, past social history, past surgical history and problem list.    Review of Systems   Constitutional: Negative for chills and fever.   Respiratory: Negative for cough, chest tightness and shortness of breath.    Cardiovascular: Negative for chest pain.   Musculoskeletal: Negative for myalgias.   Neurological: Negative for dizziness, light-headedness and headache.       Objective   Physical Exam  Vitals and nursing note reviewed.   Constitutional:       Appearance: He is well-developed.   Neck:      Thyroid: No thyromegaly.      Vascular: No carotid bruit.   Cardiovascular:      Rate and Rhythm: Normal rate and regular rhythm.      Heart sounds: Normal heart sounds. No murmur heard.   No friction rub. No gallop.    Pulmonary:      Effort: Pulmonary effort is normal. No respiratory distress.      Breath sounds: Normal breath sounds. No  wheezing or rales.   Abdominal:      General: Bowel sounds are normal. There is no distension.      Palpations: Abdomen is soft.      Tenderness: There is no abdominal tenderness. There is no guarding or rebound.           Assessment/Plan   Diagnoses and all orders for this visit:    1. Essential hypertension (Primary)  -     Comprehensive Metabolic Panel    2. Mixed hyperlipidemia  -     Lipid Panel    3. Elevated blood sugar  -     Hemoglobin A1c    Other orders  -     atorvastatin (LIPITOR) 40 MG tablet; Take 1 tablet by mouth Daily.  Dispense: 90 tablet; Refill: 1  -     lisinopril (PRINIVIL,ZESTRIL) 20 MG tablet; Take 1 tablet by mouth Daily.  Dispense: 90 tablet; Refill: 1      Taiwo is here today for follow-up.  His blood pressure seems to be doing well.  We are going to check on status of his cholesterol.  He did have some elevations in his blood sugar in the past and we will also do hemoglobin A1c.

## 2021-04-20 LAB
ALBUMIN SERPL-MCNC: 4.7 G/DL (ref 3.5–5.2)
ALBUMIN/GLOB SERPL: 1.8 G/DL
ALP SERPL-CCNC: 71 U/L (ref 39–117)
ALT SERPL-CCNC: 33 U/L (ref 1–41)
AST SERPL-CCNC: 23 U/L (ref 1–40)
BILIRUB SERPL-MCNC: 0.3 MG/DL (ref 0–1.2)
BUN SERPL-MCNC: 17 MG/DL (ref 6–20)
BUN/CREAT SERPL: 20.7 (ref 7–25)
CALCIUM SERPL-MCNC: 9.8 MG/DL (ref 8.6–10.5)
CHLORIDE SERPL-SCNC: 104 MMOL/L (ref 98–107)
CHOLEST SERPL-MCNC: 182 MG/DL (ref 0–200)
CO2 SERPL-SCNC: 25.6 MMOL/L (ref 22–29)
CREAT SERPL-MCNC: 0.82 MG/DL (ref 0.76–1.27)
GLOBULIN SER CALC-MCNC: 2.6 GM/DL
GLUCOSE SERPL-MCNC: 94 MG/DL (ref 65–99)
HBA1C MFR BLD: 5.6 % (ref 4.8–5.6)
HDLC SERPL-MCNC: 38 MG/DL (ref 40–60)
LDLC SERPL CALC-MCNC: 109 MG/DL (ref 0–100)
POTASSIUM SERPL-SCNC: 4.3 MMOL/L (ref 3.5–5.2)
PROT SERPL-MCNC: 7.3 G/DL (ref 6–8.5)
SODIUM SERPL-SCNC: 141 MMOL/L (ref 136–145)
TRIGL SERPL-MCNC: 198 MG/DL (ref 0–150)
VLDLC SERPL CALC-MCNC: 35 MG/DL (ref 5–40)

## 2021-08-16 ENCOUNTER — OFFICE VISIT (OUTPATIENT)
Dept: FAMILY MEDICINE CLINIC | Facility: CLINIC | Age: 36
End: 2021-08-16

## 2021-08-16 VITALS
HEART RATE: 82 BPM | HEIGHT: 72 IN | DIASTOLIC BLOOD PRESSURE: 80 MMHG | TEMPERATURE: 98.1 F | BODY MASS INDEX: 42.66 KG/M2 | WEIGHT: 315 LBS | OXYGEN SATURATION: 99 % | SYSTOLIC BLOOD PRESSURE: 122 MMHG

## 2021-08-16 DIAGNOSIS — R73.9 ELEVATED BLOOD SUGAR: ICD-10-CM

## 2021-08-16 DIAGNOSIS — I10 ESSENTIAL HYPERTENSION: ICD-10-CM

## 2021-08-16 DIAGNOSIS — E78.2 MIXED HYPERLIPIDEMIA: Primary | ICD-10-CM

## 2021-08-16 PROCEDURE — 99214 OFFICE O/P EST MOD 30 MIN: CPT | Performed by: INTERNAL MEDICINE

## 2021-08-16 RX ORDER — LISINOPRIL 20 MG/1
20 TABLET ORAL DAILY
Qty: 90 TABLET | Refills: 1 | Status: SHIPPED | OUTPATIENT
Start: 2021-08-16 | End: 2022-04-11 | Stop reason: SDUPTHER

## 2021-08-16 RX ORDER — ATORVASTATIN CALCIUM 40 MG/1
40 TABLET, FILM COATED ORAL DAILY
Qty: 90 TABLET | Refills: 1 | Status: SHIPPED | OUTPATIENT
Start: 2021-08-16 | End: 2022-04-11 | Stop reason: SDUPTHER

## 2021-08-16 NOTE — PROGRESS NOTES
Subjective Chief complaint is checkup on blood pressure and cholesterol but also return to work statement  Taiwo Parisi is a 36 y.o. male.     History of Present Illness Taiwo is here today for follow-up on his cholesterol and blood pressure.  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  It seems to be doing okay today.  He may have been a little bit nervous.  He does have hyperlipidemia and his last testing looked okay on his 40 mg of atorvastatin.  If he gets the co-op position he will be out of the state for approximately 6 months and wanted to get lab work done before he left.  They are requiring return to work statements for an old knee injury that I never saw the patient for.  He certainly has been working with this for years.  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  He did require an emergency room visit and a follow-up visit with a back specialist.  He no longer is taking any medicine and has returned to work for that as well.  I do not see why it cannot continue to work in his current capacity with no restrictions.  He has had prior problems with some elevated blood sugars that have not risen to the level of diabetes.  He is due to have a check on this in a month or so but he might be out of town so we are going to check it today.  We will also check his cholesterol for his prior hyperlipidemia.  The following portions of the patient's history were reviewed and updated as appropriate: allergies, current medications, past family history, past medical history, past social history, past surgical history and problem list.    Review of Systems   Constitutional: Negative for chills and fever.   Respiratory: Negative for chest tightness and shortness of breath.    Cardiovascular: Negative for chest pain.       Objective   Physical Exam  Constitutional:       Appearance: Normal appearance.   Cardiovascular:      Rate and  Rhythm: Normal rate and regular rhythm.   Pulmonary:      Effort: Pulmonary effort is normal.      Breath sounds: No wheezing or rales.   Musculoskeletal:      Comments: He has normal flexion and extension of both knees.  There is no problem with internal or external rotation of either hip   Neurological:      Mental Status: He is alert.           Assessment/Plan   Diagnoses and all orders for this visit:    1. Mixed hyperlipidemia (Primary)  -     Comprehensive Metabolic Panel  -     Lipid Panel    2. Essential hypertension    3. Elevated blood sugar  -     Hemoglobin A1c    Other orders  -     atorvastatin (LIPITOR) 40 MG tablet; Take 1 tablet by mouth Daily.  Dispense: 90 tablet; Refill: 1  -     lisinopril (PRINIVIL,ZESTRIL) 20 MG tablet; Take 1 tablet by mouth Daily.  Dispense: 90 tablet; Refill: 1

## 2021-08-17 LAB
ALBUMIN SERPL-MCNC: 4.7 G/DL (ref 3.5–5.2)
ALBUMIN/GLOB SERPL: 1.7 G/DL
ALP SERPL-CCNC: 71 U/L (ref 39–117)
ALT SERPL-CCNC: 35 U/L (ref 1–41)
AST SERPL-CCNC: 24 U/L (ref 1–40)
BILIRUB SERPL-MCNC: 0.3 MG/DL (ref 0–1.2)
BUN SERPL-MCNC: 11 MG/DL (ref 6–20)
BUN/CREAT SERPL: 12.9 (ref 7–25)
CALCIUM SERPL-MCNC: 9.8 MG/DL (ref 8.6–10.5)
CHLORIDE SERPL-SCNC: 103 MMOL/L (ref 98–107)
CHOLEST SERPL-MCNC: 217 MG/DL (ref 0–200)
CO2 SERPL-SCNC: 26.9 MMOL/L (ref 22–29)
CREAT SERPL-MCNC: 0.85 MG/DL (ref 0.76–1.27)
GLOBULIN SER CALC-MCNC: 2.7 GM/DL
GLUCOSE SERPL-MCNC: 99 MG/DL (ref 65–99)
HBA1C MFR BLD: 5.9 % (ref 4.8–5.6)
HDLC SERPL-MCNC: 38 MG/DL (ref 40–60)
LDLC SERPL CALC-MCNC: 127 MG/DL (ref 0–100)
POTASSIUM SERPL-SCNC: 4.6 MMOL/L (ref 3.5–5.2)
PROT SERPL-MCNC: 7.4 G/DL (ref 6–8.5)
SODIUM SERPL-SCNC: 143 MMOL/L (ref 136–145)
TRIGL SERPL-MCNC: 290 MG/DL (ref 0–150)
VLDLC SERPL CALC-MCNC: 52 MG/DL (ref 5–40)

## 2022-04-11 RX ORDER — LISINOPRIL 20 MG/1
20 TABLET ORAL DAILY
Qty: 90 TABLET | Refills: 1 | Status: SHIPPED | OUTPATIENT
Start: 2022-04-11 | End: 2022-10-17 | Stop reason: SDUPTHER

## 2022-04-11 RX ORDER — ATORVASTATIN CALCIUM 40 MG/1
40 TABLET, FILM COATED ORAL DAILY
Qty: 90 TABLET | Refills: 1 | Status: SHIPPED | OUTPATIENT
Start: 2022-04-11 | End: 2022-11-08 | Stop reason: SDUPTHER

## 2022-04-20 ENCOUNTER — OFFICE VISIT (OUTPATIENT)
Dept: FAMILY MEDICINE CLINIC | Facility: CLINIC | Age: 37
End: 2022-04-20

## 2022-04-20 VITALS
OXYGEN SATURATION: 96 % | SYSTOLIC BLOOD PRESSURE: 132 MMHG | DIASTOLIC BLOOD PRESSURE: 78 MMHG | HEIGHT: 72 IN | BODY MASS INDEX: 42.66 KG/M2 | WEIGHT: 315 LBS | HEART RATE: 102 BPM

## 2022-04-20 DIAGNOSIS — G47.33 OBSTRUCTIVE SLEEP APNEA: ICD-10-CM

## 2022-04-20 DIAGNOSIS — E78.2 MIXED HYPERLIPIDEMIA: ICD-10-CM

## 2022-04-20 DIAGNOSIS — I10 ESSENTIAL HYPERTENSION: Primary | ICD-10-CM

## 2022-04-20 PROCEDURE — 99213 OFFICE O/P EST LOW 20 MIN: CPT | Performed by: INTERNAL MEDICINE

## 2022-04-20 NOTE — PROGRESS NOTES
Answers for HPI/ROS submitted by the patient on 4/20/2022  What is the primary reason for your visit?: High Blood Pressure  Chronicity: recurrent  Onset: more than 1 year ago  Progression since onset: unchanged  Condition status: controlled  anxiety: No  blurred vision: No  chest pain: No  headaches: Yes  malaise/fatigue: No  neck pain: No  orthopnea: No  palpitations: No  peripheral edema: No  PND: No  shortness of breath: No  sweats: No  Agents associated with hypertension: NSAIDs  CAD risks: dyslipidemia, family history, obesity, sedentary lifestyle, stress  Compliance problems: diet, exercise    Subjective Complaints follow-up on blood pressure  Taiwo Parisi is a 36 y.o. male.     History of Present Illness is here today for follow-up on his blood pressure.  He is on lisinopril to 20 mg dose and his blood pressure looks to be doing okay on this.  His initial heart rate was elevated but he did have to rush to get here.  I did retake it at 88.  He does have an occasional headache over the last 2 weeks.  It is very low-grade.  Tylenol makes it go away if he takes anything for it.  He has been under little stress.  He recently completed a 36 page paper for school.  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  He does have sleep apnea.  He could not wear the facemask because of claustrophobic symptoms.    The following portions of the patient's history were reviewed and updated as appropriate: allergies, current medications, past family history, past medical history, past social history, past surgical history and problem list.    Review of Systems   Eyes: Negative for blurred vision.   Respiratory: Negative for shortness of breath.    Cardiovascular: Negative for chest pain and palpitations.   Musculoskeletal: Negative for neck pain.   Neurological: Positive for headache.       Objective   Physical Exam  Vitals and nursing note reviewed.   Eyes:      Comments:  Funduscopic exam shows no papilledema   Cardiovascular:      Rate and Rhythm: Normal rate and regular rhythm.      Heart sounds: No murmur heard.    No friction rub. No gallop.   Pulmonary:      Effort: Pulmonary effort is normal.      Breath sounds: No wheezing, rhonchi or rales.           Assessment/Plan   Diagnoses and all orders for this visit:    1. Essential hypertension (Primary)  -     CBC & Differential; Future  -     Comprehensive Metabolic Panel; Future    2. Mixed hyperlipidemia  -     Lipid Panel; Future    3. Obstructive sleep apnea  -     Ambulatory Referral to ENT (Otolaryngology)      Taiwo is here today for follow-up.  His blood pressure is doing well.  We are going to have him do some fasting lab work.  Going to refer him to an ear nose and throat doctor about the possible inspire device.

## 2022-05-16 ENCOUNTER — OFFICE VISIT (OUTPATIENT)
Dept: FAMILY MEDICINE CLINIC | Facility: CLINIC | Age: 37
End: 2022-05-16

## 2022-05-16 VITALS
OXYGEN SATURATION: 97 % | SYSTOLIC BLOOD PRESSURE: 118 MMHG | HEART RATE: 88 BPM | WEIGHT: 315 LBS | HEIGHT: 72 IN | DIASTOLIC BLOOD PRESSURE: 86 MMHG | BODY MASS INDEX: 42.66 KG/M2

## 2022-05-16 DIAGNOSIS — E66.01 CLASS 3 SEVERE OBESITY DUE TO EXCESS CALORIES WITHOUT SERIOUS COMORBIDITY WITH BODY MASS INDEX (BMI) OF 45.0 TO 49.9 IN ADULT: Primary | ICD-10-CM

## 2022-05-16 PROCEDURE — 99213 OFFICE O/P EST LOW 20 MIN: CPT | Performed by: INTERNAL MEDICINE

## 2022-05-16 NOTE — PROGRESS NOTES
Answers for HPI/ROS submitted by the patient on 5/16/2022  What is the primary reason for your visit?: Other  Please describe your symptoms.: None. Talk about weight loss  Have you had these symptoms before?: Yes  How long have you been having these symptoms?: Greater than 2 weeks    Subjective Chief complaint is discussed weight loss  Taiwo Parisi is a 36 y.o. male.     History of Present Illness Taiwo is here today to discuss weight loss.  His weight currently is 375 pounds.  His body mass index is greater than 50.  He does have sleep apnea.  In order to qualify for the inspire implantable device his body mass index cannot be much more than 32.  That would put his weight at 235 pounds.  He has decided that he needs to get serious about his weight.  Unfortunately he is working full-time and also starting a new job as well as going to school full-time.  We did discuss possibilities.  Weight loss is a difficult thing to do without exercise.  We did discuss finding time to exercise but that may be a little bit hard given his schedule.  We did discuss weight watchers which is a tried and true program.  We did discuss seeing a nutritionist and we are going to make referral for that.  His insurance is through [facility] and therefore we are going to let him contact their nutrition program.  We did mention the HMR program at [facility].  We did discuss weight loss medications.  He is going to try initially and see how he does without these.  Lastly we did discuss bariatric surgery but is not really interested in that.    The following portions of the patient's history were reviewed and updated as appropriate: allergies, current medications, past family history, past medical history, past social history, past surgical history and problem list.    Review of Systems   Constitutional: Negative for chills and fever.       Objective   Physical Exam  Constitutional:       Appearance: Normal appearance.    Cardiovascular:      Rate and Rhythm: Normal rate and regular rhythm.   Pulmonary:      Effort: Pulmonary effort is normal.      Breath sounds: No wheezing, rhonchi or rales.   Neurological:      Mental Status: He is alert.           Assessment & Plan   Diagnoses and all orders for this visit:    1. Class 3 severe obesity due to excess calories without serious comorbidity with body mass index (BMI) of 45.0 to 49.9 in adult ([facility]) (Primary)    2. Body mass index (BMI) 45.0-49.9, adult ([facility])  -     Ambulatory Referral to Nutrition Services      Taiwo is here today for weight loss discussion.  He is going to try seeing a nutritionist first.  We did advise to find some time each day to exercise.  We did discuss weight loss medications and had him sign the appropriate controlled substance forms.  He is going to check with me in a month and let me know his progress.

## 2022-10-17 ENCOUNTER — OFFICE VISIT (OUTPATIENT)
Dept: FAMILY MEDICINE CLINIC | Facility: CLINIC | Age: 37
End: 2022-10-17

## 2022-10-17 VITALS
OXYGEN SATURATION: 98 % | SYSTOLIC BLOOD PRESSURE: 118 MMHG | HEIGHT: 72 IN | WEIGHT: 315 LBS | BODY MASS INDEX: 42.66 KG/M2 | HEART RATE: 79 BPM | DIASTOLIC BLOOD PRESSURE: 78 MMHG

## 2022-10-17 DIAGNOSIS — R41.89 BRAIN FOG: Primary | ICD-10-CM

## 2022-10-17 DIAGNOSIS — I10 ESSENTIAL HYPERTENSION: ICD-10-CM

## 2022-10-17 DIAGNOSIS — H53.9 VISUAL DISTURBANCE: ICD-10-CM

## 2022-10-17 DIAGNOSIS — E78.2 MIXED HYPERLIPIDEMIA: ICD-10-CM

## 2022-10-17 PROCEDURE — 90471 IMMUNIZATION ADMIN: CPT | Performed by: INTERNAL MEDICINE

## 2022-10-17 PROCEDURE — 99214 OFFICE O/P EST MOD 30 MIN: CPT | Performed by: INTERNAL MEDICINE

## 2022-10-17 PROCEDURE — 90686 IIV4 VACC NO PRSV 0.5 ML IM: CPT | Performed by: INTERNAL MEDICINE

## 2022-10-17 RX ORDER — LISINOPRIL 20 MG/1
20 TABLET ORAL DAILY
Qty: 90 TABLET | Refills: 1 | Status: SHIPPED | OUTPATIENT
Start: 2022-10-17

## 2022-10-17 NOTE — PROGRESS NOTES
Answers for HPI/ROS submitted by the patient on 10/16/2022  What is the primary reason for your visit?: Neurological Problem  altered mental status: Yes  clumsiness: Yes  focal sensory loss: No  focal weakness: No  loss of balance: Yes  memory loss: Yes  near-syncope: No  slurred speech: No  syncope: No  visual change: Yes  weakness: Yes  Chronicity: new  Onset: 1 to 4 weeks ago  Onset quality: gradually  Progression since onset: gradually worsening  Focality: no focality noted  abdominal pain: No  auditory change: No  aura: No  back pain: No  bladder incontinence: No  bowel incontinence: No  chest pain: No  confusion: No  diaphoresis: No  dizziness: Yes  fatigue: No  fever: No  headaches: Yes  light-headedness: Yes  nausea: No  neck pain: No  palpitations: No  shortness of breath: Yes  vertigo: No  vomiting: No  Treatments tried: acetaminophen, walking  Improvement on treatment: no relief    Subjective Chief complaint is brain fog  Taiwo Parisi is a 37 y.o. male.     History of Present Illness Taiwo is here today for complaints of brain fog.  This is been present for approximately a month or so.  He does not recall anything different in terms of illness.  He has not had COVID recently.  He did have a months ago.  He did not have any initial brain fog after that.  He is on cholesterol medicine in the form of atorvastatin.  He has been on some form of that since approximately 2013.  He also has sleep apnea.  His weight appears to have gone up by approximately 30 pounds.  He reports that he is sleeping no differently with his CPAP.  He occasionally has some difficulty finding a word he wants to use.  Sometimes he feels like his peripheral vision is not working appropriately.    The following portions of the patient's history were reviewed and updated as appropriate: allergies, current medications, past family history, past medical history, past social history, past surgical history and problem list.    Review  of Systems   Constitutional: Negative for diaphoresis, fatigue and fever.   Respiratory: Positive for shortness of breath.    Cardiovascular: Negative for chest pain and palpitations.   Gastrointestinal: Negative for abdominal pain, nausea and vomiting.   Genitourinary: Negative for urinary incontinence.   Musculoskeletal: Negative for back pain and neck pain.   Neurological: Positive for dizziness, weakness and light-headedness. Negative for syncope and confusion.       Objective   Physical Exam  Vitals and nursing note reviewed.   Constitutional:       Appearance: Normal appearance.   Eyes:      General: No scleral icterus.     Extraocular Movements: Extraocular movements intact.      Conjunctiva/sclera: Conjunctivae normal.      Pupils: Pupils are equal, round, and reactive to light.      Funduscopic exam:     Right eye: No papilledema.   Neck:      Thyroid: No thyroid mass or thyromegaly.      Vascular: No carotid bruit.   Cardiovascular:      Rate and Rhythm: Normal rate and regular rhythm.   Pulmonary:      Effort: Pulmonary effort is normal. No respiratory distress.      Breath sounds: No wheezing or rhonchi.   Neurological:      General: No focal deficit present.      Mental Status: He is alert.      Cranial Nerves: No cranial nerve deficit.      Coordination: Coordination normal.      Comments: Romberg testing is negative           Assessment & Plan   Diagnoses and all orders for this visit:    1. Brain fog (Primary)  -     TSH+Free T4  -     Folate  -     Vitamin B12  -     Methylmalonic Acid, Serum  -     MRI Brain With & Without Contrast; Future    2. Essential hypertension  -     Comprehensive Metabolic Panel    3. Mixed hyperlipidemia  -     Lipid Panel  -     TSH+Free T4    4. Visual disturbance  -     MRI Brain With & Without Contrast; Future    Other orders  -     FluLaval/Fluarix/Fluzone >6 Months  -     lisinopril (PRINIVIL,ZESTRIL) 20 MG tablet; Take 1 tablet by mouth Daily.  Dispense: 90 tablet;  Refill: 1    Taiwo is here today for some issues with brain fog and concentration.  I am going to check some lab work to look at this.  I did advise him to try stopping the atorvastatin for approximately 1 month.  He has not had a recent case of COVID and therefore I do not think this would be a long COVID type syndrome.  For the visual disturbance I did encourage him to see an ophthalmologist.  I do not see anything that would look like a central vein or artery occlusion.  With the disturbance in peripheral vision I am going to get an MRI scan of the brain.

## 2022-10-20 LAB
ALBUMIN SERPL-MCNC: 4.7 G/DL (ref 3.5–5.2)
ALBUMIN/GLOB SERPL: 1.7 G/DL
ALP SERPL-CCNC: 73 U/L (ref 39–117)
ALT SERPL-CCNC: 34 U/L (ref 1–41)
AST SERPL-CCNC: 24 U/L (ref 1–40)
BILIRUB SERPL-MCNC: 0.4 MG/DL (ref 0–1.2)
BUN SERPL-MCNC: 12 MG/DL (ref 6–20)
BUN/CREAT SERPL: 13.6 (ref 7–25)
CALCIUM SERPL-MCNC: 9.2 MG/DL (ref 8.6–10.5)
CHLORIDE SERPL-SCNC: 104 MMOL/L (ref 98–107)
CHOLEST SERPL-MCNC: 213 MG/DL (ref 0–200)
CO2 SERPL-SCNC: 25.4 MMOL/L (ref 22–29)
CREAT SERPL-MCNC: 0.88 MG/DL (ref 0.76–1.27)
EGFRCR SERPLBLD CKD-EPI 2021: 112.2 ML/MIN/1.73
FOLATE SERPL-MCNC: 6.04 NG/ML (ref 4.78–24.2)
GLOBULIN SER CALC-MCNC: 2.7 GM/DL
GLUCOSE SERPL-MCNC: 99 MG/DL (ref 65–99)
HDLC SERPL-MCNC: 39 MG/DL (ref 40–60)
LDLC SERPL CALC-MCNC: 141 MG/DL (ref 0–100)
METHYLMALONATE SERPL-SCNC: 152 NMOL/L (ref 0–378)
POTASSIUM SERPL-SCNC: 4.6 MMOL/L (ref 3.5–5.2)
PROT SERPL-MCNC: 7.4 G/DL (ref 6–8.5)
SODIUM SERPL-SCNC: 141 MMOL/L (ref 136–145)
T4 FREE SERPL-MCNC: 1.1 NG/DL (ref 0.93–1.7)
TRIGL SERPL-MCNC: 185 MG/DL (ref 0–150)
TSH SERPL DL<=0.005 MIU/L-ACNC: 0.98 UIU/ML (ref 0.27–4.2)
VIT B12 SERPL-MCNC: 468 PG/ML (ref 211–946)
VLDLC SERPL CALC-MCNC: 33 MG/DL (ref 5–40)

## 2022-10-21 DIAGNOSIS — R41.89 BRAIN FOG: Primary | ICD-10-CM

## 2022-10-21 DIAGNOSIS — H53.9 VISUAL DISTURBANCE: ICD-10-CM

## 2022-11-04 DIAGNOSIS — Q28.2 ARTERIOVENOUS MALFORMATION OF BRAIN: Primary | ICD-10-CM

## 2022-11-04 DIAGNOSIS — Q04.8 CEREBELLAR TONSILLAR ECTOPIA: ICD-10-CM

## 2022-11-08 RX ORDER — ATORVASTATIN CALCIUM 40 MG/1
40 TABLET, FILM COATED ORAL DAILY
Qty: 90 TABLET | Refills: 1 | Status: SHIPPED | OUTPATIENT
Start: 2022-11-08

## 2022-11-12 ENCOUNTER — APPOINTMENT (OUTPATIENT)
Dept: MRI IMAGING | Facility: HOSPITAL | Age: 37
End: 2022-11-12

## 2023-03-13 ENCOUNTER — OFFICE VISIT (OUTPATIENT)
Dept: FAMILY MEDICINE CLINIC | Facility: CLINIC | Age: 38
End: 2023-03-13
Payer: COMMERCIAL

## 2023-03-13 VITALS
BODY MASS INDEX: 42.66 KG/M2 | HEIGHT: 72 IN | HEART RATE: 82 BPM | DIASTOLIC BLOOD PRESSURE: 84 MMHG | SYSTOLIC BLOOD PRESSURE: 124 MMHG | WEIGHT: 315 LBS | OXYGEN SATURATION: 96 %

## 2023-03-13 DIAGNOSIS — Q28.2 AVM (ARTERIOVENOUS MALFORMATION) BRAIN: ICD-10-CM

## 2023-03-13 DIAGNOSIS — E78.2 MIXED HYPERLIPIDEMIA: Primary | ICD-10-CM

## 2023-03-13 PROCEDURE — 99213 OFFICE O/P EST LOW 20 MIN: CPT | Performed by: INTERNAL MEDICINE

## 2023-03-13 RX ORDER — LEVETIRACETAM 500 MG/1
500 TABLET ORAL 2 TIMES DAILY
COMMUNITY

## 2023-03-13 NOTE — PROGRESS NOTES
Answers for HPI/ROS submitted by the patient on 3/13/2023  What is the primary reason for your visit?: Other  Please describe your symptoms.: Updating on previously diagnosed neuro issues and asking opinion on next steps  Have you had these symptoms before?: Yes  How long have you been having these symptoms?: Greater than 2 weeks  Please list any medications you are currently taking for this condition.: Kepra  Please describe any probable cause for these symptoms. : AVM    Subjective Taiwo is here today for follow-up on his AVM.  He would like an opinion from me regarding possible options.  Taiwo Parisi is a 37 y.o. male.     History of Present Illness Taiwo is here today for follow-up.  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  He has been given options of observing it with a approximately 2% chance of rupture each year.  It does not sound like it is cumulative based on his conversation.  Surgery did not seem to be an option per the specialist.  He did not want to do anything because of the proximity to [facility].  He did refer him to a radiation oncologist.  He is wanting to know whether he should get a second opinion.  He was given the name of someone at the [facility].    The following portions of the patient's history were reviewed and updated as appropriate: allergies, current medications, past family history, past medical history, past social history, past surgical history and problem list.    Review of Systems    Objective   Physical Exam  Constitutional:       Appearance: Normal appearance.   Cardiovascular:      Rate and Rhythm: Normal rate.   Pulmonary:      Effort: Pulmonary effort is normal.   Neurological:      General: No focal deficit present.      Mental Status: He is alert.           Assessment & Plan   Diagnoses and all orders for this visit:    1. Mixed hyperlipidemia (Primary)  -     Lipid Panel; Future    2. AVM (arteriovenous  malformation) sonny Maravilla is here today for discussion regarding his AV malformation.  I did advise him to check with his insurance regarding whether [facility] is a in-network facility.  If not then perhaps the surgeon can write a letter to see if they can get it approved to do something about it there.  In terms of options I think I would try to steer away from surgery.  I also would not feel comfortable just watching something this large each year.  I think if it were me I would choose the radiation option.

## 2023-04-24 RX ORDER — LISINOPRIL 20 MG/1
TABLET ORAL
Qty: 90 TABLET | Refills: 1 | Status: SHIPPED | OUTPATIENT
Start: 2023-04-24

## 2023-06-12 RX ORDER — ATORVASTATIN CALCIUM 40 MG/1
TABLET, FILM COATED ORAL
Qty: 90 TABLET | Refills: 1 | Status: SHIPPED | OUTPATIENT
Start: 2023-06-12

## 2023-08-28 RX ORDER — LISINOPRIL 20 MG/1
TABLET ORAL
Qty: 90 TABLET | Refills: 1 | Status: SHIPPED | OUTPATIENT
Start: 2023-08-28

## 2023-12-18 RX ORDER — ATORVASTATIN CALCIUM 40 MG/1
40 TABLET, FILM COATED ORAL DAILY
Qty: 90 TABLET | Refills: 1 | Status: SHIPPED | OUTPATIENT
Start: 2023-12-18

## 2024-04-18 ENCOUNTER — OFFICE VISIT (OUTPATIENT)
Dept: FAMILY MEDICINE CLINIC | Facility: CLINIC | Age: 39
End: 2024-04-18
Payer: COMMERCIAL

## 2024-04-18 VITALS
BODY MASS INDEX: 42.66 KG/M2 | HEART RATE: 79 BPM | RESPIRATION RATE: 18 BRPM | WEIGHT: 315 LBS | OXYGEN SATURATION: 96 % | DIASTOLIC BLOOD PRESSURE: 88 MMHG | HEIGHT: 72 IN | SYSTOLIC BLOOD PRESSURE: 120 MMHG

## 2024-04-18 DIAGNOSIS — E66.01 MORBID (SEVERE) OBESITY DUE TO EXCESS CALORIES: ICD-10-CM

## 2024-04-18 DIAGNOSIS — I10 ESSENTIAL HYPERTENSION: Primary | ICD-10-CM

## 2024-04-18 DIAGNOSIS — E78.2 MIXED HYPERLIPIDEMIA: ICD-10-CM

## 2024-04-18 RX ORDER — LEVETIRACETAM 750 MG/1
1500 TABLET, FILM COATED, EXTENDED RELEASE ORAL DAILY
COMMUNITY
Start: 2024-01-25 | End: 2024-04-24

## 2024-04-18 RX ORDER — LISINOPRIL 20 MG/1
20 TABLET ORAL DAILY
Qty: 90 TABLET | Refills: 1 | Status: SHIPPED | OUTPATIENT
Start: 2024-04-18

## 2024-04-18 RX ORDER — ATORVASTATIN CALCIUM 40 MG/1
40 TABLET, FILM COATED ORAL DAILY
Qty: 90 TABLET | Refills: 1 | Status: SHIPPED | OUTPATIENT
Start: 2024-04-18

## 2024-04-18 NOTE — PROGRESS NOTES
Answers submitted by the patient for this visit:  Primary Reason for Visit (Submitted on 4/15/2024)  What is the primary reason for your visit?: Other  Other (Submitted on 4/15/2024)  Please describe your symptoms.: Consulting on weight loss plans, post Gamma knife check up  Have you had these symptoms before?: Yes  How long have you been having these symptoms?: Greater than 2 weeks  Subjective chief complaint is weight loss counseling  Taiwo Parisi is a 38 y.o. male.     Obesity   Taiwo is here today for weight loss counseling.  We did discuss initially being healthy at the [facility] trap by trying to eat right and exercise more.  We encouraged 150 minutes of aerobic exercise and twice weekly isometric exercise.  We did encourage cutting portion sizes.  We did advise that if he can cut a quarter of his portion sizes and not go back for seconds that we will eliminate approximately 500 saravanan in a day.  That should amount to about a pound a week of weight loss.  We did advise that his ideal weight for his height would be approximately 185 pounds.  We advised that to maintain a body weight of 185 pounds will require approximately 2000 to 2100 saravanan/day.  If he therefore sticks to that then he should start losing weight down towards the 185 pounds.  We did encourage him using [employer].  He does have hypertension.  That currently seems to be fairly well-controlled.  He also has some hyperlipidemia and is on a fairly good-sized dose of atorvastatin.  He is not bothering his muscles.  The following portions of the patient's history were reviewed and updated as appropriate: allergies, current medications, and problem list.    Review of Systems   Respiratory:  Positive for shortness of breath.         He has had some shortness of breath when he wakes up in the morning.  He does have obstructive sleep apnea and does not tolerate his CPAP machine.  He does report that Benadryl seems to help that.     Objective    Physical Exam  Vitals and nursing note reviewed.   Constitutional:       Appearance: He is obese.   Neck:      Vascular: No carotid bruit.   Cardiovascular:      Rate and Rhythm: Normal rate and regular rhythm.      Pulses: Normal pulses.      Heart sounds: No murmur heard.     No friction rub. No gallop.   Pulmonary:      Effort: Pulmonary effort is normal.      Breath sounds: No wheezing, rhonchi or rales.   Abdominal:      General: Bowel sounds are normal.      Palpations: Abdomen is soft.      Tenderness: There is no abdominal tenderness. There is no guarding or rebound.   Musculoskeletal:      Right lower leg: No edema.      Left lower leg: No edema.   Neurological:      Mental Status: He is alert.       Assessment & Plan   Diagnoses and all orders for this visit:    1. Essential hypertension (Primary)  -     Comprehensive Metabolic Panel  -     Lipid Panel  -     Hemoglobin A1c    2. Mixed hyperlipidemia  -     Comprehensive Metabolic Panel  -     Lipid Panel  -     Hemoglobin A1c    3. Morbid (severe) obesity due to excess calories  -     Comprehensive Metabolic Panel  -     Lipid Panel  -     Hemoglobin A1c    Other orders  -     atorvastatin (LIPITOR) 40 MG tablet; Take 1 tablet by mouth Daily.  Dispense: 90 tablet; Refill: 1  -     lisinopril (PRINIVIL,ZESTRIL) 20 MG tablet; Take 1 tablet by mouth Daily.  Dispense: 90 tablet; Refill: 1    Taiwo is here today for weight loss counseling.  We did spend approximately 30 minutes discussing this in terms of diet and exercise and proper calories and nutrition.  We are going to check on status of his lab work in terms of cholesterol A1c and chemistries.  I did advise to see me back in approximately 3 months to see if he is making any progress.

## 2024-04-19 LAB
ALBUMIN SERPL-MCNC: 4.5 G/DL (ref 3.5–5.2)
ALBUMIN/GLOB SERPL: 1.6 G/DL
ALP SERPL-CCNC: 79 U/L (ref 39–117)
ALT SERPL-CCNC: 51 U/L (ref 1–41)
AST SERPL-CCNC: 35 U/L (ref 1–40)
BILIRUB SERPL-MCNC: 0.4 MG/DL (ref 0–1.2)
BUN SERPL-MCNC: 16 MG/DL (ref 6–20)
BUN/CREAT SERPL: 16.5 (ref 7–25)
CALCIUM SERPL-MCNC: 9.7 MG/DL (ref 8.6–10.5)
CHLORIDE SERPL-SCNC: 105 MMOL/L (ref 98–107)
CHOLEST SERPL-MCNC: 214 MG/DL (ref 0–200)
CO2 SERPL-SCNC: 26.5 MMOL/L (ref 22–29)
CREAT SERPL-MCNC: 0.97 MG/DL (ref 0.76–1.27)
EGFRCR SERPLBLD CKD-EPI 2021: 101.2 ML/MIN/1.73
GLOBULIN SER CALC-MCNC: 2.8 GM/DL
GLUCOSE SERPL-MCNC: 106 MG/DL (ref 65–99)
HBA1C MFR BLD: 6 % (ref 4.8–5.6)
HDLC SERPL-MCNC: 37 MG/DL (ref 40–60)
LDLC SERPL CALC-MCNC: 129 MG/DL (ref 0–100)
POTASSIUM SERPL-SCNC: 4.4 MMOL/L (ref 3.5–5.2)
PROT SERPL-MCNC: 7.3 G/DL (ref 6–8.5)
SODIUM SERPL-SCNC: 143 MMOL/L (ref 136–145)
TRIGL SERPL-MCNC: 271 MG/DL (ref 0–150)
VLDLC SERPL CALC-MCNC: 48 MG/DL (ref 5–40)

## 2024-07-19 ENCOUNTER — OFFICE VISIT (OUTPATIENT)
Dept: FAMILY MEDICINE CLINIC | Facility: CLINIC | Age: 39
End: 2024-07-19
Payer: COMMERCIAL

## 2024-07-19 VITALS
DIASTOLIC BLOOD PRESSURE: 78 MMHG | SYSTOLIC BLOOD PRESSURE: 132 MMHG | WEIGHT: 315 LBS | HEIGHT: 72 IN | HEART RATE: 75 BPM | OXYGEN SATURATION: 95 % | BODY MASS INDEX: 42.66 KG/M2

## 2024-07-19 DIAGNOSIS — E66.01 MORBID (SEVERE) OBESITY DUE TO EXCESS CALORIES: Primary | ICD-10-CM

## 2024-07-19 DIAGNOSIS — G40.909 SEIZURE DISORDER: ICD-10-CM

## 2024-07-19 PROBLEM — Q27.30 AVM (ARTERIOVENOUS MALFORMATION): Status: ACTIVE | Noted: 2024-07-19

## 2024-07-19 PROCEDURE — 99214 OFFICE O/P EST MOD 30 MIN: CPT | Performed by: INTERNAL MEDICINE

## 2024-07-19 RX ORDER — PHENTERMINE AND TOPIRAMATE 3.75; 23 MG/1; MG/1
1 CAPSULE, EXTENDED RELEASE ORAL DAILY
Qty: 30 CAPSULE | Refills: 0 | Status: SHIPPED | OUTPATIENT
Start: 2024-07-19

## 2024-07-19 RX ORDER — LEVETIRACETAM 1000 MG/1
500 TABLET ORAL 2 TIMES DAILY
COMMUNITY
Start: 2024-02-01

## 2024-07-19 NOTE — PROGRESS NOTES
Answers submitted by the patient for this visit:  Primary Reason for Visit (Submitted on 7/17/2024)  What is the primary reason for your visit?: Other  Other (Submitted on 7/17/2024)  Please describe your symptoms.: Weight loss follow up  Have you had these symptoms before?: Yes  How long have you been having these symptoms?: Greater than 2 weeks  Subjective chief complaint is follow-up on obesity and weight loss  Taiwo Parisi is a 38 y.o. male.     Weight Loss   Taiwo is here today for follow-up.  He reports that since his last visit he has been trying hard.  He is altered his dietary habits.  He has been trying to walk more.  He reports that he generally eats a salad for lunch that is less than 300 saravanan.  He has been increasing his walking during breaks at work.  He is now walking approximately 2 miles around the workplace during the day.  Unfortunately he is not losing weight and his weight is actually gone up.  We did discuss other weight loss medications.  We discussed the stimulant medicine such as phentermine and diethylpropion.  Patient has hypertension these may not be great medicines.  We did discuss Contrave however Wellbutrin that may not be good for his seizure disorder.  We did discuss Qsymia.  It has a little bit less phentermine in it.  We would have to keep an eye on his blood pressure initially.  It does not appear to be contraindicated with seizures that I can find.  We did advise him to check with his neurologist about the Topamax and it with his other seizure medicine.  However I did not find a contraindication to this.  He really is not much interested in the injectables at this point in time.  However they are likely going to be his best option    The following portions of the patient's history were reviewed and updated as appropriate: allergies, current medications, and problem list.    Review of Systems   Constitutional:  Positive for unexpected weight change and weight loss.      Objective   Physical Exam  Vitals and nursing note reviewed.   Constitutional:       Appearance: Normal appearance.   Cardiovascular:      Rate and Rhythm: Normal rate and regular rhythm.   Pulmonary:      Effort: Pulmonary effort is normal.      Breath sounds: No wheezing, rhonchi or rales.   Musculoskeletal:      Right lower leg: No edema.      Left lower leg: No edema.   Neurological:      General: No focal deficit present.      Mental Status: He is alert.      Cranial Nerves: No cranial nerve deficit.     Assessment & Plan   Diagnoses and all orders for this visit:    1. Morbid (severe) obesity due to excess calories (Primary)    2. BMI 50.0-59.9, adult    3. Seizure disorder    Other orders  -     Discontinue: naltrexone-bupropion ER (CONTRAVE) 8-90 MG tablet; Wk 1: 1 tab daily, Wk 2: 1 tab twice a day, Wk 3: 2 tabs in AM, 1 tab in PM, Wk 4: 2 tabs twice a day, Maintenance dose: 2 tabs twice daily.  Dispense: 120 tablet; Refill: 0  -     Phentermine-Topiramate (Qsymia) 3.75-23 MG capsule sustained-release 24 hr; Take 1 capsule by mouth Daily.  Dispense: 30 capsule; Refill: 0    Taiwo is here today for follow-up on his obesity and attempts at weight loss.  He does not seem to be making much progress.  As outlined above we initially were going to prescribe Contrave but the Wellbutrin turns out to have a contraindication with seizures.  We are going to let him try Qsymia and we will keep a close eye on his blood pressure by following up monthly.  He is going to check with his neurologist regarding the Topamax portion.

## 2024-10-10 RX ORDER — ATORVASTATIN CALCIUM 40 MG/1
40 TABLET, FILM COATED ORAL DAILY
Qty: 90 TABLET | Refills: 1 | Status: SHIPPED | OUTPATIENT
Start: 2024-10-10

## 2024-10-10 RX ORDER — LISINOPRIL 20 MG/1
20 TABLET ORAL DAILY
Qty: 90 TABLET | Refills: 1 | Status: SHIPPED | OUTPATIENT
Start: 2024-10-10

## 2025-01-17 ENCOUNTER — TELEPHONE (OUTPATIENT)
Dept: FAMILY MEDICINE CLINIC | Facility: CLINIC | Age: 40
End: 2025-01-17
Payer: COMMERCIAL

## 2025-01-17 NOTE — TELEPHONE ENCOUNTER
Called patient to advise I called [facility] and was [facility] who stated qsymia is non formulary and does not requires a authorization patient will have to pay out of pocket per Roxy. Patient understands.